# Patient Record
Sex: FEMALE | Race: BLACK OR AFRICAN AMERICAN | NOT HISPANIC OR LATINO
[De-identification: names, ages, dates, MRNs, and addresses within clinical notes are randomized per-mention and may not be internally consistent; named-entity substitution may affect disease eponyms.]

---

## 2017-12-15 ENCOUNTER — MOBILE ON CALL (OUTPATIENT)
Age: 57
End: 2017-12-15

## 2017-12-15 PROBLEM — Z00.00 ENCOUNTER FOR PREVENTIVE HEALTH EXAMINATION: Status: ACTIVE | Noted: 2017-12-15

## 2017-12-20 ENCOUNTER — APPOINTMENT (OUTPATIENT)
Dept: ORTHOPEDIC SURGERY | Facility: CLINIC | Age: 57
End: 2017-12-20
Payer: COMMERCIAL

## 2017-12-20 VITALS
WEIGHT: 170 LBS | HEIGHT: 66 IN | BODY MASS INDEX: 27.32 KG/M2 | HEART RATE: 82 BPM | SYSTOLIC BLOOD PRESSURE: 139 MMHG | DIASTOLIC BLOOD PRESSURE: 85 MMHG

## 2017-12-20 PROCEDURE — 73610 X-RAY EXAM OF ANKLE: CPT | Mod: LT

## 2017-12-20 PROCEDURE — 99203 OFFICE O/P NEW LOW 30 MIN: CPT

## 2018-01-17 ENCOUNTER — APPOINTMENT (OUTPATIENT)
Dept: ORTHOPEDIC SURGERY | Facility: CLINIC | Age: 58
End: 2018-01-17
Payer: COMMERCIAL

## 2018-01-17 PROCEDURE — 99214 OFFICE O/P EST MOD 30 MIN: CPT

## 2018-01-26 ENCOUNTER — APPOINTMENT (OUTPATIENT)
Dept: ORTHOPEDIC SURGERY | Facility: CLINIC | Age: 58
End: 2018-01-26
Payer: COMMERCIAL

## 2018-01-26 PROCEDURE — 99212 OFFICE O/P EST SF 10 MIN: CPT

## 2018-01-26 RX ORDER — DOCUSATE SODIUM 100 MG/1
100 CAPSULE, LIQUID FILLED ORAL
Qty: 50 | Refills: 0 | Status: COMPLETED | COMMUNITY
Start: 2018-01-26 | End: 2018-02-20

## 2018-01-26 RX ORDER — ASPIRIN 325 MG/1
325 TABLET, FILM COATED ORAL
Qty: 28 | Refills: 0 | Status: COMPLETED | COMMUNITY
Start: 2018-01-26 | End: 2018-02-25

## 2018-01-29 ENCOUNTER — OUTPATIENT (OUTPATIENT)
Dept: OUTPATIENT SERVICES | Facility: HOSPITAL | Age: 58
LOS: 1 days | End: 2018-01-29
Payer: MEDICAID

## 2018-01-29 VITALS
SYSTOLIC BLOOD PRESSURE: 115 MMHG | WEIGHT: 175.05 LBS | RESPIRATION RATE: 16 BRPM | TEMPERATURE: 98 F | OXYGEN SATURATION: 98 % | HEART RATE: 76 BPM | HEIGHT: 66 IN | DIASTOLIC BLOOD PRESSURE: 76 MMHG

## 2018-01-29 DIAGNOSIS — Z98.890 OTHER SPECIFIED POSTPROCEDURAL STATES: Chronic | ICD-10-CM

## 2018-01-29 DIAGNOSIS — S82.842A DISPLACED BIMALLEOLAR FRACTURE OF LEFT LOWER LEG, INITIAL ENCOUNTER FOR CLOSED FRACTURE: ICD-10-CM

## 2018-01-29 DIAGNOSIS — T84.498A OTHER MECHANICAL COMPLICATION OF OTHER INTERNAL ORTHOPEDIC DEVICES, IMPLANTS AND GRAFTS, INITIAL ENCOUNTER: ICD-10-CM

## 2018-01-29 DIAGNOSIS — T84.84XA PAIN DUE TO INTERNAL ORTHOPEDIC PROSTHETIC DEVICES, IMPLANTS AND GRAFTS, INITIAL ENCOUNTER: ICD-10-CM

## 2018-01-29 DIAGNOSIS — Z01.818 ENCOUNTER FOR OTHER PREPROCEDURAL EXAMINATION: ICD-10-CM

## 2018-01-29 DIAGNOSIS — S82.892S OTHER FRACTURE OF LEFT LOWER LEG, SEQUELA: ICD-10-CM

## 2018-01-29 DIAGNOSIS — Z96.7 PRESENCE OF OTHER BONE AND TENDON IMPLANTS: Chronic | ICD-10-CM

## 2018-01-29 PROCEDURE — G0463: CPT

## 2018-01-29 RX ORDER — SODIUM CHLORIDE 9 MG/ML
3 INJECTION INTRAMUSCULAR; INTRAVENOUS; SUBCUTANEOUS EVERY 8 HOURS
Qty: 0 | Refills: 0 | Status: DISCONTINUED | OUTPATIENT
Start: 2018-02-08 | End: 2018-02-16

## 2018-01-29 NOTE — H&P PST ADULT - MUSCULOSKELETAL
details… detailed exam no calf tenderness/no joint erythema/joint swelling/diminished strength/left ankle/no joint warmth/decreased ROM due to pain

## 2018-01-29 NOTE — H&P PST ADULT - NEGATIVE GENERAL GENITOURINARY SYMPTOMS
no dysuria/no flank pain L/no renal colic/no flank pain R/normal urinary frequency/no hematuria/no urinary hesitancy/no incontinence

## 2018-01-29 NOTE — H&P PST ADULT - EXTREMITIES COMMENTS
left ankle swelling, tenderness to palpation, left big toe bunion, toes movable and warm to touch bilaterally , capillary refill less than 2 seconds bilaterally, left big toe numbness as per patient

## 2018-01-29 NOTE — H&P PST ADULT - HISTORY OF PRESENT ILLNESS
57 years old female presented with left ankle pain, swelling , tenderness to touch x 2 years, pt had left ankle fracture and s/p ORIF of left ankle 2015. Diagnosed with other fracture with left leg and pain due to internal orthopedic prosthetic devices, implants and grafts and is scheduled for left ankle removal of hardware on 2/8/18.

## 2018-01-29 NOTE — H&P PST ADULT - PMH
Bunion of left foot    Bunion of right foot    Closed bimalleolar fracture of left ankle, initial encounter  2015, s/p fall  Insufficiency of tear film of both eyes    Menopause    Pterygium eye, right    Seasonal allergies    Sinusitis, chronic    Swollen ankles  left  Toe deformity, left  big toe  Uterine polyp

## 2018-01-29 NOTE — H&P PST ADULT - ASSESSMENT
57 years old female presented with other fracture with left leg and pain due to internal orthopedic prosthetic devices, implants and grafts .

## 2018-01-29 NOTE — H&P PST ADULT - PSH
S/P bunionectomy  left -2014,  right - 2012  S/P ORIF (open reduction internal fixation) fracture  left ankle with hardware- 2015

## 2018-01-29 NOTE — H&P PST ADULT - RS GEN PE MLT RESP DETAILS PC
respirations non-labored/no wheezes/good air movement/airway patent/no rhonchi/normal/no rales/clear to auscultation bilaterally

## 2018-01-29 NOTE — H&P PST ADULT - NSANTHOSAYNRD_GEN_A_CORE
No. CANDELARIA screening performed.  STOP BANG Legend: 0-2 = LOW Risk; 3-4 = INTERMEDIATE Risk; 5-8 = HIGH Risk

## 2018-01-29 NOTE — H&P PST ADULT - BLOOD AVOIDANCE/RESTRICTIONS, PROFILE
personal belief - pt doesn't want to have blood transfusion- Bloodless Program Cat 1, Noelle Villa ( [facility]  notified) personal beliefs, pt does not want to have blood transfusions, Bloodless  notified ( Noelle Villa) , pt enrolled in Bloodless Program Category 1, documents and HCP in chart,

## 2018-01-29 NOTE — H&P PST ADULT - NEGATIVE ENMT SYMPTOMS
hx of sinusitis, seasonal allergies/no dysphagia/no nasal discharge/no post-nasal discharge/no hearing difficulty/no nasal congestion/no throat pain/no sinus symptoms/no ear pain

## 2018-01-29 NOTE — H&P PST ADULT - PROBLEM SELECTOR PLAN 1
Patient  is scheduled for left ankle removal of hardware on 2/8/18.Patient is at moderate risk for this surgery.

## 2018-02-08 ENCOUNTER — APPOINTMENT (OUTPATIENT)
Dept: ORTHOPEDIC SURGERY | Facility: HOSPITAL | Age: 58
End: 2018-02-08

## 2018-02-08 ENCOUNTER — TRANSCRIPTION ENCOUNTER (OUTPATIENT)
Age: 58
End: 2018-02-08

## 2018-02-08 ENCOUNTER — OUTPATIENT (OUTPATIENT)
Dept: OUTPATIENT SERVICES | Facility: HOSPITAL | Age: 58
LOS: 1 days | End: 2018-02-08
Payer: MEDICAID

## 2018-02-08 VITALS
DIASTOLIC BLOOD PRESSURE: 64 MMHG | WEIGHT: 175.05 LBS | SYSTOLIC BLOOD PRESSURE: 120 MMHG | HEART RATE: 86 BPM | OXYGEN SATURATION: 97 % | TEMPERATURE: 98 F | HEIGHT: 66 IN | RESPIRATION RATE: 16 BRPM

## 2018-02-08 VITALS
RESPIRATION RATE: 14 BRPM | HEART RATE: 70 BPM | DIASTOLIC BLOOD PRESSURE: 57 MMHG | SYSTOLIC BLOOD PRESSURE: 122 MMHG | OXYGEN SATURATION: 100 % | TEMPERATURE: 98 F

## 2018-02-08 DIAGNOSIS — T84.84XA PAIN DUE TO INTERNAL ORTHOPEDIC PROSTHETIC DEVICES, IMPLANTS AND GRAFTS, INITIAL ENCOUNTER: ICD-10-CM

## 2018-02-08 DIAGNOSIS — Z98.890 OTHER SPECIFIED POSTPROCEDURAL STATES: Chronic | ICD-10-CM

## 2018-02-08 DIAGNOSIS — S82.892S OTHER FRACTURE OF LEFT LOWER LEG, SEQUELA: ICD-10-CM

## 2018-02-08 DIAGNOSIS — Z96.7 PRESENCE OF OTHER BONE AND TENDON IMPLANTS: Chronic | ICD-10-CM

## 2018-02-08 PROCEDURE — 20680 REMOVAL OF IMPLANT DEEP: CPT

## 2018-02-08 PROCEDURE — 76000 FLUOROSCOPY <1 HR PHYS/QHP: CPT

## 2018-02-08 RX ORDER — CELECOXIB 200 MG/1
200 CAPSULE ORAL ONCE
Qty: 0 | Refills: 0 | Status: COMPLETED | OUTPATIENT
Start: 2018-02-08 | End: 2018-02-08

## 2018-02-08 RX ORDER — OXYCODONE AND ACETAMINOPHEN 5; 325 MG/1; MG/1
1 TABLET ORAL ONCE
Qty: 0 | Refills: 0 | Status: DISCONTINUED | OUTPATIENT
Start: 2018-02-08 | End: 2018-02-08

## 2018-02-08 RX ORDER — ONDANSETRON 8 MG/1
4 TABLET, FILM COATED ORAL ONCE
Qty: 0 | Refills: 0 | Status: DISCONTINUED | OUTPATIENT
Start: 2018-02-08 | End: 2018-02-08

## 2018-02-08 RX ORDER — HYDROMORPHONE HYDROCHLORIDE 2 MG/ML
0.5 INJECTION INTRAMUSCULAR; INTRAVENOUS; SUBCUTANEOUS
Qty: 0 | Refills: 0 | Status: DISCONTINUED | OUTPATIENT
Start: 2018-02-08 | End: 2018-02-08

## 2018-02-08 RX ORDER — ACETAMINOPHEN 500 MG
975 TABLET ORAL ONCE
Qty: 0 | Refills: 0 | Status: COMPLETED | OUTPATIENT
Start: 2018-02-08 | End: 2018-02-08

## 2018-02-08 RX ADMIN — HYDROMORPHONE HYDROCHLORIDE 0.5 MILLIGRAM(S): 2 INJECTION INTRAMUSCULAR; INTRAVENOUS; SUBCUTANEOUS at 11:20

## 2018-02-08 RX ADMIN — HYDROMORPHONE HYDROCHLORIDE 0.5 MILLIGRAM(S): 2 INJECTION INTRAMUSCULAR; INTRAVENOUS; SUBCUTANEOUS at 11:59

## 2018-02-08 RX ADMIN — SODIUM CHLORIDE 3 MILLILITER(S): 9 INJECTION INTRAMUSCULAR; INTRAVENOUS; SUBCUTANEOUS at 08:51

## 2018-02-08 RX ADMIN — Medication 975 MILLIGRAM(S): at 08:51

## 2018-02-08 RX ADMIN — HYDROMORPHONE HYDROCHLORIDE 0.5 MILLIGRAM(S): 2 INJECTION INTRAMUSCULAR; INTRAVENOUS; SUBCUTANEOUS at 11:44

## 2018-02-08 RX ADMIN — CELECOXIB 200 MILLIGRAM(S): 200 CAPSULE ORAL at 08:51

## 2018-02-08 NOTE — ASU PATIENT PROFILE, ADULT - BLOOD AVOIDANCE/RESTRICTIONS, PROFILE
[place] beliefs/personal beliefs, pt does not want to have blood transfusions, Bloodless  notified ( Noelle Villa) , pt enrolled in Bloodless Program Category 1, documents and HCP in chart,

## 2018-02-08 NOTE — BRIEF OPERATIVE NOTE - PROCEDURE
<<-----Click on this checkbox to enter Procedure Removal of hardware  02/08/2018  left ankle  Active  CFRUGE

## 2018-02-08 NOTE — ASU DISCHARGE PLAN (ADULT/PEDIATRIC). - NOTIFY
Swelling that continues/Persistent Nausea and Vomiting/Bleeding that does not stop/Pain not relieved by Medications Fever greater than 101/Bleeding that does not stop/Pain not relieved by Medications/Swelling that continues/Persistent Nausea and Vomiting

## 2018-02-08 NOTE — ASU DISCHARGE PLAN (ADULT/PEDIATRIC). - DRESSING FT
keep dressing clean, dry, intact until follow up appointment; weightbear as tolerated in postop shoe

## 2018-02-08 NOTE — ASU DISCHARGE PLAN (ADULT/PEDIATRIC). - MEDICATION SUMMARY - MEDICATIONS TO TAKE
I will START or STAY ON the medications listed below when I get home from the hospital:    Flonase 50 mcg/inh nasal spray  -- 1 spray(s) into nose once a day, As Needed  -- Indication: For Per PCP    Fish Oil oral capsule  -- 1 tab(s) by mouth once a day  -- Indication: For Per PCP    estradiol 0.1 mg/24 hours twice weekly transdermal film, extended release  -- 1 patch by transdermal patch 2 times a week  -- Indication: For Per PCP    multivitamin  -- 1 tab(s) by mouth once a day  -- Indication: For Per PCP

## 2018-02-14 ENCOUNTER — APPOINTMENT (OUTPATIENT)
Dept: ORTHOPEDIC SURGERY | Facility: CLINIC | Age: 58
End: 2018-02-14
Payer: COMMERCIAL

## 2018-02-14 PROCEDURE — 99024 POSTOP FOLLOW-UP VISIT: CPT

## 2018-02-28 ENCOUNTER — APPOINTMENT (OUTPATIENT)
Dept: ORTHOPEDIC SURGERY | Facility: CLINIC | Age: 58
End: 2018-02-28
Payer: COMMERCIAL

## 2018-02-28 PROCEDURE — 99024 POSTOP FOLLOW-UP VISIT: CPT

## 2018-04-16 ENCOUNTER — APPOINTMENT (OUTPATIENT)
Dept: ORTHOPEDIC SURGERY | Facility: CLINIC | Age: 58
End: 2018-04-16
Payer: MEDICAID

## 2018-04-16 DIAGNOSIS — Z98.890 OTHER SPECIFIED POSTPROCEDURAL STATES: ICD-10-CM

## 2018-04-16 DIAGNOSIS — M21.612 BUNION OF LEFT FOOT: ICD-10-CM

## 2018-04-16 DIAGNOSIS — T84.84XA PAIN DUE TO INTERNAL ORTHOPEDIC PROSTHETIC DEVICES, IMPLANTS AND GRAFTS, INITIAL ENCOUNTER: ICD-10-CM

## 2018-04-16 DIAGNOSIS — S82.892S OTHER FRACTURE OF LEFT LOWER LEG, SEQUELA: ICD-10-CM

## 2018-04-16 DIAGNOSIS — M19.079 PRIMARY OSTEOARTHRITIS, UNSPECIFIED ANKLE AND FOOT: ICD-10-CM

## 2018-04-16 PROCEDURE — 73630 X-RAY EXAM OF FOOT: CPT | Mod: LT

## 2018-04-16 PROCEDURE — 99213 OFFICE O/P EST LOW 20 MIN: CPT | Mod: 24

## 2018-08-14 ENCOUNTER — RESULT REVIEW (OUTPATIENT)
Age: 58
End: 2018-08-14

## 2021-02-24 ENCOUNTER — RESULT REVIEW (OUTPATIENT)
Age: 61
End: 2021-02-24

## 2021-03-23 ENCOUNTER — RESULT REVIEW (OUTPATIENT)
Age: 61
End: 2021-03-23

## 2021-04-08 PROBLEM — M21.612 BUNION OF LEFT FOOT: Chronic | Status: ACTIVE | Noted: 2018-01-29

## 2021-04-08 PROBLEM — H04.123 DRY EYE SYNDROME OF BILATERAL LACRIMAL GLANDS: Chronic | Status: ACTIVE | Noted: 2018-01-29

## 2021-04-08 PROBLEM — M20.62: Chronic | Status: ACTIVE | Noted: 2018-01-29

## 2021-04-08 PROBLEM — J30.2 OTHER SEASONAL ALLERGIC RHINITIS: Chronic | Status: ACTIVE | Noted: 2018-01-29

## 2021-04-08 PROBLEM — J32.9 CHRONIC SINUSITIS, UNSPECIFIED: Chronic | Status: ACTIVE | Noted: 2018-01-29

## 2021-04-08 PROBLEM — M25.471 EFFUSION, RIGHT ANKLE: Chronic | Status: ACTIVE | Noted: 2018-01-29

## 2021-04-08 PROBLEM — Z78.0 ASYMPTOMATIC MENOPAUSAL STATE: Chronic | Status: ACTIVE | Noted: 2018-01-29

## 2021-04-08 PROBLEM — S82.842A DISPLACED BIMALLEOLAR FRACTURE OF LEFT LOWER LEG, INITIAL ENCOUNTER FOR CLOSED FRACTURE: Chronic | Status: ACTIVE | Noted: 2018-01-29

## 2021-04-08 PROBLEM — M21.611 BUNION OF RIGHT FOOT: Chronic | Status: ACTIVE | Noted: 2018-01-29

## 2021-04-08 PROBLEM — N84.0 POLYP OF CORPUS UTERI: Chronic | Status: ACTIVE | Noted: 2018-01-29

## 2021-04-08 PROBLEM — H11.001 UNSPECIFIED PTERYGIUM OF RIGHT EYE: Chronic | Status: ACTIVE | Noted: 2018-01-29

## 2021-04-13 ENCOUNTER — APPOINTMENT (OUTPATIENT)
Dept: DISASTER EMERGENCY | Facility: CLINIC | Age: 61
End: 2021-04-13

## 2021-04-13 DIAGNOSIS — Z01.818 ENCOUNTER FOR OTHER PREPROCEDURAL EXAMINATION: ICD-10-CM

## 2021-04-14 ENCOUNTER — OUTPATIENT (OUTPATIENT)
Dept: OUTPATIENT SERVICES | Facility: HOSPITAL | Age: 61
LOS: 1 days | End: 2021-04-14
Payer: MEDICAID

## 2021-04-14 VITALS
OXYGEN SATURATION: 98 % | SYSTOLIC BLOOD PRESSURE: 110 MMHG | DIASTOLIC BLOOD PRESSURE: 80 MMHG | HEART RATE: 77 BPM | TEMPERATURE: 97 F | HEIGHT: 65 IN | WEIGHT: 184.09 LBS | RESPIRATION RATE: 14 BRPM

## 2021-04-14 DIAGNOSIS — N95.0 POSTMENOPAUSAL BLEEDING: ICD-10-CM

## 2021-04-14 DIAGNOSIS — N93.9 ABNORMAL UTERINE AND VAGINAL BLEEDING, UNSPECIFIED: ICD-10-CM

## 2021-04-14 DIAGNOSIS — Z96.7 PRESENCE OF OTHER BONE AND TENDON IMPLANTS: Chronic | ICD-10-CM

## 2021-04-14 DIAGNOSIS — Z87.59 PERSONAL HISTORY OF OTHER COMPLICATIONS OF PREGNANCY, CHILDBIRTH AND THE PUERPERIUM: Chronic | ICD-10-CM

## 2021-04-14 DIAGNOSIS — Z98.890 OTHER SPECIFIED POSTPROCEDURAL STATES: Chronic | ICD-10-CM

## 2021-04-14 LAB
ANION GAP SERPL CALC-SCNC: 8 MMOL/L — SIGNIFICANT CHANGE UP (ref 7–14)
BUN SERPL-MCNC: 12 MG/DL — SIGNIFICANT CHANGE UP (ref 7–23)
CALCIUM SERPL-MCNC: 9.2 MG/DL — SIGNIFICANT CHANGE UP (ref 8.4–10.5)
CHLORIDE SERPL-SCNC: 100 MMOL/L — SIGNIFICANT CHANGE UP (ref 98–107)
CO2 SERPL-SCNC: 28 MMOL/L — SIGNIFICANT CHANGE UP (ref 22–31)
CREAT SERPL-MCNC: 0.87 MG/DL — SIGNIFICANT CHANGE UP (ref 0.5–1.3)
GLUCOSE SERPL-MCNC: 87 MG/DL — SIGNIFICANT CHANGE UP (ref 70–99)
HCT VFR BLD CALC: 38.9 % — SIGNIFICANT CHANGE UP (ref 34.5–45)
HGB BLD-MCNC: 12.5 G/DL — SIGNIFICANT CHANGE UP (ref 11.5–15.5)
MCHC RBC-ENTMCNC: 29.4 PG — SIGNIFICANT CHANGE UP (ref 27–34)
MCHC RBC-ENTMCNC: 32.1 GM/DL — SIGNIFICANT CHANGE UP (ref 32–36)
MCV RBC AUTO: 91.5 FL — SIGNIFICANT CHANGE UP (ref 80–100)
NRBC # BLD: 0 /100 WBCS — SIGNIFICANT CHANGE UP
NRBC # FLD: 0 K/UL — SIGNIFICANT CHANGE UP
PLATELET # BLD AUTO: 288 K/UL — SIGNIFICANT CHANGE UP (ref 150–400)
POTASSIUM SERPL-MCNC: 3.6 MMOL/L — SIGNIFICANT CHANGE UP (ref 3.5–5.3)
POTASSIUM SERPL-SCNC: 3.6 MMOL/L — SIGNIFICANT CHANGE UP (ref 3.5–5.3)
RBC # BLD: 4.25 M/UL — SIGNIFICANT CHANGE UP (ref 3.8–5.2)
RBC # FLD: 15.5 % — HIGH (ref 10.3–14.5)
SARS-COV-2 N GENE NPH QL NAA+PROBE: NOT DETECTED
SODIUM SERPL-SCNC: 136 MMOL/L — SIGNIFICANT CHANGE UP (ref 135–145)
WBC # BLD: 6.32 K/UL — SIGNIFICANT CHANGE UP (ref 3.8–10.5)
WBC # FLD AUTO: 6.32 K/UL — SIGNIFICANT CHANGE UP (ref 3.8–10.5)

## 2021-04-14 PROCEDURE — 93010 ELECTROCARDIOGRAM REPORT: CPT

## 2021-04-14 RX ORDER — SODIUM CHLORIDE 9 MG/ML
1000 INJECTION, SOLUTION INTRAVENOUS
Refills: 0 | Status: DISCONTINUED | OUTPATIENT
Start: 2021-04-16 | End: 2021-05-01

## 2021-04-14 RX ORDER — FLUTICASONE PROPIONATE 50 MCG
1 SPRAY, SUSPENSION NASAL
Qty: 0 | Refills: 0 | DISCHARGE

## 2021-04-14 RX ORDER — OMEGA-3 ACID ETHYL ESTERS 1 G
1 CAPSULE ORAL
Qty: 0 | Refills: 0 | DISCHARGE

## 2021-04-14 NOTE — H&P PST ADULT - NSICDXPROBLEM_GEN_ALL_CORE_FT
PROBLEM DIAGNOSES  Problem: Abnormal vaginal bleeding  Assessment and Plan: Pt. is scheduled for a D&C, hysteroscopy 4/16/21.  Pt. verbalized understanding of instructions.  Pt. had COVID test.  Pt. has not seen her PMD since pre pandemic and states she had palpitaitons last month.  Pt. instructed to obtain medical clearance.

## 2021-04-14 NOTE — H&P PST ADULT - HISTORY OF PRESENT ILLNESS
Pt. is a 61 yo female with abnormal vaginal bleeding that started 12/2020.  It fluctuates with severity of bleeding.

## 2021-04-14 NOTE — H&P PST ADULT - NSICDXFAMILYHX_GEN_ALL_CORE_FT
FAMILY HISTORY:  Mother  Still living? Yes, Estimated age: Age Unknown  Diabetes mellitus, type 2, Age at diagnosis: Age Unknown

## 2021-04-14 NOTE — H&P PST ADULT - NSICDXPASTSURGICALHX_GEN_ALL_CORE_FT
PAST SURGICAL HISTORY:  H/O 1      S/P bunionectomy left -,  right -     S/P hardware removal left ankle-    S/P ORIF (open reduction internal fixation) fracture left ankle with hardware-

## 2021-04-14 NOTE — H&P PST ADULT - REASON FOR ADMISSION
"D&C, I've been having a lot of bleeding, heavy bleeding and he did a biopsy in the office but not enough tissue"

## 2021-04-14 NOTE — H&P PST ADULT - NSANTHTOTALSCORECAL_ENT_A_CORE
Pt resting in bed no distress. To be NPO after midnight for possible EGD tomorrow. meds given per orders. No pain. Up in room stand by assist. Hourly rounds continue. Call light in reach. 2

## 2021-04-14 NOTE — H&P PST ADULT - MARITAL STATUS
Called to perform covid pre-screening prior to patients appointment.  Screening performed on all individuals presenting to the appointment.     Patient will be coming to his appointment accompanied by Self.      Triage Questions:  1. Do you have a fever >100.4*F or 83*C?   No  2. Do you have a new or worsening cough, shortness of breath, or sore throat?  none  3. Do you have any new onset of nausea, vomiting, diarrhea?         none  4. Have you had any new onset of chills, repeated shaking with chills,muscle pain, headache or loss of taste or smell?        None  5. Have you or a household member tested positive for COVID-19 in the last 14 days?        No    Screening performed on all individuals presenting to the appointment.     Screening is negative.     Single

## 2021-04-14 NOTE — H&P PST ADULT - NSICDXPASTMEDICALHX_GEN_ALL_CORE_FT
PAST MEDICAL HISTORY:  Bunion of left foot     Bunion of right foot     Closed bimalleolar fracture of left ankle, initial encounter 2015, s/p fall    Insufficiency of tear film of both eyes     Menopause     Pterygium eye, right     Seasonal allergies     Sinusitis, chronic     Swollen ankles left    Toe deformity, left big toe    Uterine polyp      PAST MEDICAL HISTORY:  Bunion of left foot     Bunion of right foot     Closed bimalleolar fracture of left ankle, initial encounter 2015, s/p fall    Insufficiency of tear film of both eyes     Menopause     Obese     Palpitations     Pterygium eye, right     Seasonal allergies     Sinusitis, chronic     Swollen ankles left    Toe deformity, left big toe    Uterine polyp

## 2021-04-15 ENCOUNTER — TRANSCRIPTION ENCOUNTER (OUTPATIENT)
Age: 61
End: 2021-04-15

## 2021-04-15 NOTE — ASU PATIENT PROFILE, ADULT - PMH
Bunion of left foot    Bunion of right foot    Closed bimalleolar fracture of left ankle, initial encounter  2015, s/p fall  Insufficiency of tear film of both eyes    Menopause    Obese    Palpitations    Pterygium eye, right    Seasonal allergies    Sinusitis, chronic    Swollen ankles  left  Toe deformity, left  big toe  Uterine polyp

## 2021-04-15 NOTE — ASU PATIENT PROFILE, ADULT - PSH
H/O 1     S/P bunionectomy  left -,  right -   S/P hardware removal  left ankle-  S/P ORIF (open reduction internal fixation) fracture  left ankle with hardware-

## 2021-04-16 ENCOUNTER — RESULT REVIEW (OUTPATIENT)
Age: 61
End: 2021-04-16

## 2021-04-16 ENCOUNTER — OUTPATIENT (OUTPATIENT)
Dept: OUTPATIENT SERVICES | Facility: HOSPITAL | Age: 61
LOS: 1 days | Discharge: HOME | End: 2021-04-16
Payer: MEDICAID

## 2021-04-16 VITALS
TEMPERATURE: 98 F | DIASTOLIC BLOOD PRESSURE: 75 MMHG | OXYGEN SATURATION: 100 % | HEIGHT: 65 IN | SYSTOLIC BLOOD PRESSURE: 129 MMHG | HEART RATE: 75 BPM | WEIGHT: 184.09 LBS | RESPIRATION RATE: 16 BRPM

## 2021-04-16 VITALS
OXYGEN SATURATION: 100 % | RESPIRATION RATE: 14 BRPM | DIASTOLIC BLOOD PRESSURE: 87 MMHG | SYSTOLIC BLOOD PRESSURE: 122 MMHG | HEART RATE: 75 BPM

## 2021-04-16 DIAGNOSIS — Z98.890 OTHER SPECIFIED POSTPROCEDURAL STATES: Chronic | ICD-10-CM

## 2021-04-16 DIAGNOSIS — Z87.59 PERSONAL HISTORY OF OTHER COMPLICATIONS OF PREGNANCY, CHILDBIRTH AND THE PUERPERIUM: Chronic | ICD-10-CM

## 2021-04-16 DIAGNOSIS — N95.0 POSTMENOPAUSAL BLEEDING: ICD-10-CM

## 2021-04-16 DIAGNOSIS — Z96.7 PRESENCE OF OTHER BONE AND TENDON IMPLANTS: Chronic | ICD-10-CM

## 2021-04-16 PROCEDURE — 88360 TUMOR IMMUNOHISTOCHEM/MANUAL: CPT | Mod: 26

## 2021-04-16 PROCEDURE — 88305 TISSUE EXAM BY PATHOLOGIST: CPT | Mod: 26

## 2021-04-16 RX ORDER — OMEGA-3 ACID ETHYL ESTERS 1 G
1 CAPSULE ORAL
Qty: 0 | Refills: 0 | DISCHARGE

## 2021-04-16 NOTE — ASU DISCHARGE PLAN (ADULT/PEDIATRIC) - CARE PROVIDER_API CALL
Kohanim, Behnam  [facility]  [address], [address]  [place] [address]  Phone: [phone]  Fax: [phone]  Follow Up Time: 2 weeks

## 2021-04-16 NOTE — ASU DISCHARGE PLAN (ADULT/PEDIATRIC) - CALL YOUR DOCTOR IF YOU HAVE ANY OF THE FOLLOWING:
Bleeding that does not stop/Pain not relieved by Medications/Fever greater than (need to indicate Fahrenheit or Celsius)/Nausea and vomiting that does not stop/Unable to urinate/Increased irritability or sluggishness

## 2021-04-16 NOTE — ASU DISCHARGE PLAN (ADULT/PEDIATRIC) - NURSING INSTRUCTIONS
You received IV Tylenol for pain management at __3:30pm _. Please DO NOT take any Tylenol (Acetaminophen) containing products, such as Vicodin, Percocet, Excedrin, and cold medications for the next 6 hours (until __9:30_ PM). DO NOT TAKE MORE THAN 3000 MG OF TYLENOL in a 24 hour period.      You received IV Toradol for pain management at _4:00pm __. Please DO NOT take Motrin/Ibuprofen/Advil/Aleve/NSAIDs (Non-Steroidal Anti-Inflammatory Drugs) for the next 6 hours (until _10__ PM).

## 2021-04-16 NOTE — BRIEF OPERATIVE NOTE - NSICDXBRIEFPROCEDURE_GEN_ALL_CORE_FT
PROCEDURES:  Hysteroscopy, without dilation and curettage of uterus 16-Apr-2021 16:39:21  Kohanim, Behnam

## 2021-04-22 LAB — SURGICAL PATHOLOGY STUDY: SIGNIFICANT CHANGE UP

## 2021-04-26 PROBLEM — E66.9 OBESITY, UNSPECIFIED: Chronic | Status: ACTIVE | Noted: 2021-04-14

## 2021-04-26 PROBLEM — R00.2 PALPITATIONS: Chronic | Status: ACTIVE | Noted: 2021-04-14

## 2021-05-01 ENCOUNTER — OUTPATIENT (OUTPATIENT)
Dept: OUTPATIENT SERVICES | Facility: HOSPITAL | Age: 61
LOS: 1 days | End: 2021-05-01
Payer: MEDICAID

## 2021-05-01 DIAGNOSIS — Z98.890 OTHER SPECIFIED POSTPROCEDURAL STATES: Chronic | ICD-10-CM

## 2021-05-01 DIAGNOSIS — Z96.7 PRESENCE OF OTHER BONE AND TENDON IMPLANTS: Chronic | ICD-10-CM

## 2021-05-01 DIAGNOSIS — Z87.59 PERSONAL HISTORY OF OTHER COMPLICATIONS OF PREGNANCY, CHILDBIRTH AND THE PUERPERIUM: Chronic | ICD-10-CM

## 2021-05-01 PROCEDURE — G9005: CPT

## 2021-05-18 ENCOUNTER — APPOINTMENT (OUTPATIENT)
Dept: GYNECOLOGIC ONCOLOGY | Facility: CLINIC | Age: 61
End: 2021-05-18
Payer: MEDICAID

## 2021-05-18 VITALS
HEIGHT: 66 IN | BODY MASS INDEX: 28.97 KG/M2 | WEIGHT: 180.25 LBS | DIASTOLIC BLOOD PRESSURE: 76 MMHG | SYSTOLIC BLOOD PRESSURE: 118 MMHG | HEART RATE: 97 BPM

## 2021-05-18 PROCEDURE — 99204 OFFICE O/P NEW MOD 45 MIN: CPT

## 2021-05-21 ENCOUNTER — INPATIENT (INPATIENT)
Facility: HOSPITAL | Age: 61
LOS: 7 days | Discharge: HOME | End: 2021-05-29
Attending: OBSTETRICS & GYNECOLOGY | Admitting: OBSTETRICS & GYNECOLOGY
Payer: MEDICAID

## 2021-05-21 ENCOUNTER — NON-APPOINTMENT (OUTPATIENT)
Age: 61
End: 2021-05-21

## 2021-05-21 VITALS
DIASTOLIC BLOOD PRESSURE: 84 MMHG | SYSTOLIC BLOOD PRESSURE: 109 MMHG | RESPIRATION RATE: 18 BRPM | TEMPERATURE: 99 F | HEIGHT: 65 IN | HEART RATE: 120 BPM | OXYGEN SATURATION: 100 %

## 2021-05-21 DIAGNOSIS — Z98.890 OTHER SPECIFIED POSTPROCEDURAL STATES: Chronic | ICD-10-CM

## 2021-05-21 DIAGNOSIS — Z87.59 PERSONAL HISTORY OF OTHER COMPLICATIONS OF PREGNANCY, CHILDBIRTH AND THE PUERPERIUM: Chronic | ICD-10-CM

## 2021-05-21 DIAGNOSIS — Z96.7 PRESENCE OF OTHER BONE AND TENDON IMPLANTS: Chronic | ICD-10-CM

## 2021-05-21 DIAGNOSIS — N93.9 ABNORMAL UTERINE AND VAGINAL BLEEDING, UNSPECIFIED: ICD-10-CM

## 2021-05-21 LAB
ALBUMIN SERPL ELPH-MCNC: 3.6 G/DL — SIGNIFICANT CHANGE UP (ref 3.3–5)
ALP SERPL-CCNC: 62 U/L — SIGNIFICANT CHANGE UP (ref 40–120)
ALT FLD-CCNC: 10 U/L — SIGNIFICANT CHANGE UP (ref 4–33)
ANION GAP SERPL CALC-SCNC: 11 MMOL/L — SIGNIFICANT CHANGE UP (ref 7–14)
AST SERPL-CCNC: 16 U/L — SIGNIFICANT CHANGE UP (ref 4–32)
BILIRUB SERPL-MCNC: <0.2 MG/DL — SIGNIFICANT CHANGE UP (ref 0.2–1.2)
BLD GP AB SCN SERPL QL: NEGATIVE — SIGNIFICANT CHANGE UP
BUN SERPL-MCNC: 19 MG/DL — SIGNIFICANT CHANGE UP (ref 7–23)
CALCIUM SERPL-MCNC: 8.7 MG/DL — SIGNIFICANT CHANGE UP (ref 8.4–10.5)
CHLORIDE SERPL-SCNC: 103 MMOL/L — SIGNIFICANT CHANGE UP (ref 98–107)
CO2 SERPL-SCNC: 24 MMOL/L — SIGNIFICANT CHANGE UP (ref 22–31)
CREAT SERPL-MCNC: 0.74 MG/DL — SIGNIFICANT CHANGE UP (ref 0.5–1.3)
GLUCOSE SERPL-MCNC: 113 MG/DL — HIGH (ref 70–99)
HCT VFR BLD CALC: 20.6 % — CRITICAL LOW (ref 34.5–45)
HGB BLD-MCNC: 6.6 G/DL — CRITICAL LOW (ref 11.5–15.5)
MCHC RBC-ENTMCNC: 29.2 PG — SIGNIFICANT CHANGE UP (ref 27–34)
MCHC RBC-ENTMCNC: 32.4 GM/DL — SIGNIFICANT CHANGE UP (ref 32–36)
MCV RBC AUTO: 90.1 FL — SIGNIFICANT CHANGE UP (ref 80–100)
NRBC # BLD: 0 /100 WBCS — SIGNIFICANT CHANGE UP
NRBC # FLD: 0 K/UL — SIGNIFICANT CHANGE UP
PLATELET # BLD AUTO: 246 K/UL — SIGNIFICANT CHANGE UP (ref 150–400)
POTASSIUM SERPL-MCNC: 4.1 MMOL/L — SIGNIFICANT CHANGE UP (ref 3.5–5.3)
POTASSIUM SERPL-SCNC: 4.1 MMOL/L — SIGNIFICANT CHANGE UP (ref 3.5–5.3)
PROT SERPL-MCNC: 6 G/DL — SIGNIFICANT CHANGE UP (ref 6–8.3)
RBC # BLD: 2.33 M/UL — LOW (ref 3.8–5.2)
RBC # FLD: 15.2 % — HIGH (ref 10.3–14.5)
RH IG SCN BLD-IMP: NEGATIVE — SIGNIFICANT CHANGE UP
RH IG SCN BLD-IMP: NEGATIVE — SIGNIFICANT CHANGE UP
SARS-COV-2 RNA SPEC QL NAA+PROBE: SIGNIFICANT CHANGE UP
SODIUM SERPL-SCNC: 138 MMOL/L — SIGNIFICANT CHANGE UP (ref 135–145)
WBC # BLD: 12.83 K/UL — HIGH (ref 3.8–10.5)
WBC # FLD AUTO: 12.83 K/UL — HIGH (ref 3.8–10.5)

## 2021-05-21 PROCEDURE — 99291 CRITICAL CARE FIRST HOUR: CPT

## 2021-05-21 PROCEDURE — 76830 TRANSVAGINAL US NON-OB: CPT | Mod: 26

## 2021-05-21 RX ORDER — ACETAMINOPHEN 500 MG
650 TABLET ORAL EVERY 6 HOURS
Refills: 0 | Status: DISCONTINUED | OUTPATIENT
Start: 2021-05-21 | End: 2021-05-26

## 2021-05-21 RX ORDER — OXYCODONE HYDROCHLORIDE 5 MG/1
5 TABLET ORAL EVERY 6 HOURS
Refills: 0 | Status: DISCONTINUED | OUTPATIENT
Start: 2021-05-21 | End: 2021-05-27

## 2021-05-21 RX ORDER — MEGESTROL ACETATE 40 MG/ML
40 SUSPENSION ORAL EVERY 12 HOURS
Refills: 0 | Status: DISCONTINUED | OUTPATIENT
Start: 2021-05-21 | End: 2021-05-24

## 2021-05-21 RX ORDER — PANTOPRAZOLE SODIUM 20 MG/1
40 TABLET, DELAYED RELEASE ORAL
Refills: 0 | Status: DISCONTINUED | OUTPATIENT
Start: 2021-05-21 | End: 2021-05-29

## 2021-05-21 RX ORDER — SODIUM CHLORIDE 9 MG/ML
1000 INJECTION, SOLUTION INTRAVENOUS
Refills: 0 | Status: DISCONTINUED | OUTPATIENT
Start: 2021-05-21 | End: 2021-05-22

## 2021-05-21 NOTE — ED PROVIDER NOTE - ATTENDING CONTRIBUTION TO CARE
I performed a history and physical exam of the patient and discussed their management with the resident and /or advanced care provider. I reviewed the resident and /or ACP's note and agree with the documented findings and plan of care. My medical decision making and observations are found above.  Awake and alert, abd soft, + vaginal bleeding

## 2021-05-21 NOTE — H&P ADULT - NSICDXPASTMEDICALHX_GEN_ALL_CORE_FT
PAST MEDICAL HISTORY:  Bunion of left foot     Bunion of right foot     Closed bimalleolar fracture of left ankle, initial encounter 2015, s/p fall    Insufficiency of tear film of both eyes     Menopause     Obese     Palpitations     Pterygium eye, right     Seasonal allergies     Sinusitis, chronic     Swollen ankles left    Toe deformity, left big toe    Uterine polyp

## 2021-05-21 NOTE — ED ADULT NURSE NOTE - OBJECTIVE STATEMENT
Pt c/o of LLQ pain and heavy vaginal bleeding x 2 weeks s/p D&C. Pt endorses dizziness and lightheadedness. Pt denies chest pain, sob, n/v/d. Respirations even/unlabored, NAD noted, sinus tachy on monitor. 20g iv to left ac, labs drawn and sent

## 2021-05-21 NOTE — H&P ADULT - HISTORY OF PRESENT ILLNESS
59yo P3 postmenopausal with simple hyperplasia with atypia (dx 4/16/21 on D&C) presenting with c/o symptomatic anemia. Pt reports PMB since 1/2021 in the setting of using Estradiol 1% patch x5 years for menopausal symptoms; she was not using progesterone concurrently. She was seen by GYN Dr. King, who performed an EMB (3/23/21) and D&C (4/16/21), the latter of which was notable for simple hyperplasia with atypia. She was seen by Dr. Hewitt for consultation on 5/18/21 and plan was made for RA hysterectomy, bilateral salpingo-oophorectomy, poss staging on 6/3. She was to stop all HRT in preparation for surgery.    Patient reports since initial D&C, she had been given 2 week course of Megace with 1 refill. She was to take 1 pill/day    59yo P3 postmenopausal with simple hyperplasia with atypia (dx 4/16/21 on D&C) presenting with c/o symptomatic anemia. Pt reports PMB since 1/2021 in the setting of using Estradiol 1% patch x5 years for menopausal symptoms; she was not using progesterone concurrently. She was seen by GYN Dr. King, who performed an EMB (3/23/21) and D&C (4/16/21), the latter of which was notable for simple hyperplasia with atypia. She was seen by Dr. Baker for second opinion and was then referred to Dr. Hewitt for consultation on 5/18/21. At that time, she had discontinued Megace x3 days and subsequently had increased bleeding. She was instructed plan was made for RA hysterectomy, bilateral salpingo-oophorectomy, poss staging on 6/3. She was to stop all HRT in preparation for surgery.    Patient reports since initial D&C, she had been given 2 week course of Megace with 1 refill. She was to take 1 pill/day      59yo P3 postmenopausal with simple hyperplasia with atypia (dx 4/16/21 on D&C) presenting with c/o symptomatic anemia. Pt reports PMB since 1/2021 in the setting of using Estradiol 1% patch x5 years for menopausal symptoms; she was not using progesterone concurrently. She was seen by GYN Dr. King, who performed an EMB (3/23/21) and D&C (4/16/21), the latter of which was notable for simple hyperplasia with atypia. After D&C, patient reports starting 2 week course of Megace with 1 refill for continued vaginal bleeding. She was to take 1 pill/day, and reports that bleeding was controlled; she would only see vaginal bleeding when using the toilet. She was seen by Dr. Baker for second opinion and was then referred to Dr. Hewitt for consultation on 5/18/21. At that time, she had discontinued Megace x3 days and subsequently had increased bleeding. She was instructed plan was made for RA hysterectomy, bilateral salpingo-oophorectomy, poss staging on 6/3. She was to stop all HRT in preparation for surgery.    Today, she presents to the ED with near syncopal episode at home when going to use shower. She denies loss of consciousness or fall. She reports having similar episodes throughout the day yesterday, associated with cold sweats, and would take frequent naps and rest to avoid LOC. She additionally restarted her Megace this AM to help with the vaginal bleeding. She reports stacking 3-4 pads and changing [id number] 2/2 saturation. She additionally packs She notes LLQ cramping discomfort, has not used analgesia.  She reports palpitations. Denies CP, SOB, HA, nausea/vomiting, fevers/chills.    61yo P3 postmenopausal with simple hyperplasia with atypia (dx 4/16/21 on D&C) presenting with c/o symptomatic anemia. Pt reports PMB since 1/2021 in the setting of using Estradiol 1% patch x5 years for menopausal symptoms; she was not using progesterone concurrently. She was seen by GYN Dr. King, who performed an EMB (3/23/21) and D&C (4/16/21), the latter of which was notable for simple hyperplasia with atypia. After D&C, patient reports starting 2 week course of Megace with 1 refill for continued vaginal bleeding. She was to take 1 pill/day, and reports that bleeding was controlled; she would only see vaginal bleeding when using the toilet. She was seen by Dr. Baker for second opinion and was then referred to Dr. Hewitt for consultation on 5/18/21. At that time, she had discontinued Megace x3 days and subsequently had increased bleeding. She was instructed plan was made for RA hysterectomy, bilateral salpingo-oophorectomy, poss staging on 6/3. She was to stop all HRT in preparation for surgery.    Today, she presents to the ED with near syncopal episode at home when going to use shower. She denies loss of consciousness or fall. She reports having similar episodes throughout the day yesterday, associated with cold sweats, and would take frequent naps and rest to avoid LOC. She additionally restarted her Megace this AM to help with the vaginal bleeding. She reports stacking 3-4 pads and changing [id number] 2/2 saturation. She additionally notes occasionally using toilet paper in addition to the pads. She notes LLQ cramping discomfort, has not used analgesia, current pain 6/10.  She reports palpitations. Denies CP, SOB, HA, nausea/vomiting, fevers/chills. Last drank a smoothie at 10:00AM.

## 2021-05-21 NOTE — ED PROVIDER NOTE - OBJECTIVE STATEMENT
59yo F no pmh presents to ED with complaint of worsening vaginal bleeding over the past 2 days and presyncopal episode prior to showering this morning, has had vagina bleeding for past several months s/p D&C on 4/16/21. Felt flushed, light headed and weak after entering shower. Pt reports seeing large volume blood, 3pads soaked in one hour, large blood clots. Pt has been taking progesterone 1-3/day. Reports mild LLQ pain for past 2 weeks, crampy in quality without radiation. Also feels fatigued. ROS otherwise negative.

## 2021-05-21 NOTE — ED ADULT TRIAGE NOTE - CHIEF COMPLAINT QUOTE
Pt had d/c for simple hyperplasia 4/1. pt co heavy vaginal bleeding x 2 weeks pt weakness. Pt reports LLQ pain 6/10 x 2 weeks

## 2021-05-21 NOTE — ED PROVIDER NOTE - CLINICAL SUMMARY MEDICAL DECISION MAKING FREE TEXT BOX
Michael:59 yo with hx of anemia and vaginal bleeding presents with +++vaginal bleeding. Will hydrate/give blood. get GYN involved early. will need chemical agents to help with bleeding Michael:59 yo with hx of anemia and vaginal bleeding presents with ++vaginal bleeding. had presyncopal episode. Will hydrate/give blood. get GYN involved early. will need chemical agents to help with bleeding

## 2021-05-21 NOTE — H&P ADULT - ASSESSMENT
61yo P3 postmenopausal with simple hyperplasia with atypia (dx 4/16/21 on D&C) presenting with c/o symptomatic anemia. Physical exam with moderate blood in vault evacuated; no active bleeding from os. Labwork notable for Hgb 6.6 and imaging notable for EM thickened to 2.4cm. Patient clinically stable at this time.     Neuro: Pain well controlled on current regimen   CV: Hemodynamically stable  Pulm: O2 sat WNL on RA, Increase ambulation, encourage incentive spirometry use  GI: Tolerating regular diet  : Voiding spontaneously  Heme: DVT ppx: Lovenox, SCDs while in bed  ID: Afebrile, No signs of infection  FEN: LR@125, Replete electrolytes PRN   Dispo:  61yo P3 postmenopausal with simple hyperplasia with atypia (dx 4/16/21 on D&C) presenting with c/o symptomatic anemia. Physical exam with moderate blood in vault evacuated; no active bleeding from os. Labwork notable for Hgb 6.6 and imaging notable for EM thickened to 2.4cm. Patient clinically stable at this time.    61yo P3 postmenopausal with simple hyperplasia with atypia (dx 4/16/21 on D&C) presenting with c/o symptomatic anemia. Physical exam with moderate blood in vault evacuated; no active bleeding from os. Labwork notable for Hgb 6.6 and imaging notable for EM thickened to 2.4cm. Patient clinically stable at this time.     Neuro: Tylenol, Oxy PRN for pain  CV: Hgb 6.6, monitor VS, 3u PRBC transfusion ordered  Pulm: O2 sat WNL on RA  FEN: LR@125, f/u AM BMP, replete lytes PRN, NPO except medications  GI: Protonix  : Voiding freely, strict I&O, monitor pad counts; Megace 40mg BID for vaginal bleeding  Heme: DVT ppx: OOB, SCDs while in bed; 3u PRBC transfusion ordered  ID: Afebrile, No signs of infection  Dispo: admit to GYN Onc, strict pad counts    seen w/A [facility]YN Onc Fellow  Brandy Altamirano R2

## 2021-05-21 NOTE — H&P ADULT - NSHPPHYSICALEXAM_GEN_ALL_CORE
Vital Signs Last 24 Hrs  T(C): 36.9 (21 May 2021 18:45), Max: 37.1 (21 May 2021 13:04)  T(F): 98.5 (21 May 2021 18:45), Max: 98.8 (21 May 2021 13:04)  HR: 91 (21 May 2021 18:45) (91 - 120)  BP: 100/60 (21 May 2021 18:45) (100/60 - 120/57)  RR: 18 (21 May 2021 18:45) (16 - 18)  SpO2: 100% (21 May 2021 18:45) (100% - 100%)    Gen: awake, alert, NAD  Pulm: CTAB, nonlabored breathing  CV: Normal S1/S2, tachycardic   Abd: soft, nontender, nondistended; no rebound/guarding  : NEFG  Speculum: moderate watery blood in vault, evacuated; no active bleeding from os; polypoid tissue at os  Bimanual: nontender fundus/adnexa b/l  Ext: nontender, nonedematous

## 2021-05-21 NOTE — ED PROVIDER NOTE - GENITOURINARY, MLM
large volume blood in vaginal vault with large clots, small tear in posterior vagina with mild active bleeding, cervical os clear, no other trauma appreciated, mildly tender inferior to os - chaperoned by nurse Mignon Raphael)

## 2021-05-21 NOTE — H&P ADULT - NSHPLABSRESULTS_GEN_ALL_CORE
6.6    12.83 )-----------( 246      ( 05-21 @ 15:24 )             20.6     05-21 @ 15:24    138  |  103  |  19  ----------------------------<  113  4.1   |  24  |  0.74    Ca    8.7      05-21 @ 15:24    TPro  6.0  /  Alb  3.6  /  TBili  <0.2  /  DBili  x   /  AST  16  /  ALT  10  /  AlkPhos  62  05-21 @ 15:24    < from: US Transvaginal (05.21.21 @ 15:23) >  EXAM:  US TRANSVAGINAL    PROCEDURE DATE:  May 21 2021     INTERPRETATION:  CLINICAL INFORMATION: Vaginal bleeding status post D&C.    LMP: N/A    COMPARISON: None available.    TECHNIQUE:  Endovaginal pelvic sonogram only.    FINDINGS:    Uterus: 13.0 cm x 9.4 cm x 10.4 cm. subserosal uterine myoma measuring up to 3.1 cm on the left and 2.0 cm posteriorly.  Endometrium: 24 mm. Thickened with regions of increased vascularity.    Right ovary: 2.5 cm x 1.2 cm x 1.9 cm. Within normal limits.  Left ovary: Not visualized.    Fluid: None.    IMPRESSION:  Thickened endometrial complex with regions of increased vascularity.  Myomatous uterus.      YANIV CASTILLO MD; Attending Radiologist  This document has been electronically signed. May 21 2021  3:45PM    < end of copied text >

## 2021-05-22 DIAGNOSIS — N93.9 ABNORMAL UTERINE AND VAGINAL BLEEDING, UNSPECIFIED: ICD-10-CM

## 2021-05-22 LAB
ANION GAP SERPL CALC-SCNC: 10 MMOL/L — SIGNIFICANT CHANGE UP (ref 7–14)
BASOPHILS # BLD AUTO: 0.06 K/UL — SIGNIFICANT CHANGE UP (ref 0–0.2)
BASOPHILS # BLD AUTO: 0.08 K/UL — SIGNIFICANT CHANGE UP (ref 0–0.2)
BASOPHILS NFR BLD AUTO: 0.5 % — SIGNIFICANT CHANGE UP (ref 0–2)
BASOPHILS NFR BLD AUTO: 0.6 % — SIGNIFICANT CHANGE UP (ref 0–2)
BUN SERPL-MCNC: 14 MG/DL — SIGNIFICANT CHANGE UP (ref 7–23)
CALCIUM SERPL-MCNC: 8.2 MG/DL — LOW (ref 8.4–10.5)
CHLORIDE SERPL-SCNC: 105 MMOL/L — SIGNIFICANT CHANGE UP (ref 98–107)
CO2 SERPL-SCNC: 21 MMOL/L — LOW (ref 22–31)
COVID-19 SPIKE DOMAIN AB INTERP: POSITIVE
COVID-19 SPIKE DOMAIN ANTIBODY RESULT: >250 U/ML — HIGH
CREAT SERPL-MCNC: 0.76 MG/DL — SIGNIFICANT CHANGE UP (ref 0.5–1.3)
EOSINOPHIL # BLD AUTO: 0.22 K/UL — SIGNIFICANT CHANGE UP (ref 0–0.5)
EOSINOPHIL # BLD AUTO: 0.22 K/UL — SIGNIFICANT CHANGE UP (ref 0–0.5)
EOSINOPHIL NFR BLD AUTO: 1.8 % — SIGNIFICANT CHANGE UP (ref 0–6)
EOSINOPHIL NFR BLD AUTO: 1.9 % — SIGNIFICANT CHANGE UP (ref 0–6)
GLUCOSE SERPL-MCNC: 91 MG/DL — SIGNIFICANT CHANGE UP (ref 70–99)
HCT VFR BLD CALC: 27.1 % — LOW (ref 34.5–45)
HCT VFR BLD CALC: 28.5 % — LOW (ref 34.5–45)
HCV AB S/CO SERPL IA: 0.09 S/CO — SIGNIFICANT CHANGE UP (ref 0–0.99)
HCV AB SERPL-IMP: SIGNIFICANT CHANGE UP
HGB BLD-MCNC: 8.9 G/DL — LOW (ref 11.5–15.5)
HGB BLD-MCNC: 9.1 G/DL — LOW (ref 11.5–15.5)
IANC: 9.14 K/UL — HIGH (ref 1.5–8.5)
IANC: 9.6 K/UL — HIGH (ref 1.5–8.5)
IMM GRANULOCYTES NFR BLD AUTO: 0.4 % — SIGNIFICANT CHANGE UP (ref 0–1.5)
IMM GRANULOCYTES NFR BLD AUTO: 0.5 % — SIGNIFICANT CHANGE UP (ref 0–1.5)
LYMPHOCYTES # BLD AUTO: 1.41 K/UL — SIGNIFICANT CHANGE UP (ref 1–3.3)
LYMPHOCYTES # BLD AUTO: 1.65 K/UL — SIGNIFICANT CHANGE UP (ref 1–3.3)
LYMPHOCYTES # BLD AUTO: 12 % — LOW (ref 13–44)
LYMPHOCYTES # BLD AUTO: 13.2 % — SIGNIFICANT CHANGE UP (ref 13–44)
MAGNESIUM SERPL-MCNC: 2.2 MG/DL — SIGNIFICANT CHANGE UP (ref 1.6–2.6)
MCHC RBC-ENTMCNC: 28.8 PG — SIGNIFICANT CHANGE UP (ref 27–34)
MCHC RBC-ENTMCNC: 29.2 PG — SIGNIFICANT CHANGE UP (ref 27–34)
MCHC RBC-ENTMCNC: 31.9 GM/DL — LOW (ref 32–36)
MCHC RBC-ENTMCNC: 32.8 GM/DL — SIGNIFICANT CHANGE UP (ref 32–36)
MCV RBC AUTO: 88.9 FL — SIGNIFICANT CHANGE UP (ref 80–100)
MCV RBC AUTO: 90.2 FL — SIGNIFICANT CHANGE UP (ref 80–100)
MONOCYTES # BLD AUTO: 0.86 K/UL — SIGNIFICANT CHANGE UP (ref 0–0.9)
MONOCYTES # BLD AUTO: 0.88 K/UL — SIGNIFICANT CHANGE UP (ref 0–0.9)
MONOCYTES NFR BLD AUTO: 7 % — SIGNIFICANT CHANGE UP (ref 2–14)
MONOCYTES NFR BLD AUTO: 7.3 % — SIGNIFICANT CHANGE UP (ref 2–14)
NEUTROPHILS # BLD AUTO: 9.14 K/UL — HIGH (ref 1.8–7.4)
NEUTROPHILS # BLD AUTO: 9.6 K/UL — HIGH (ref 1.8–7.4)
NEUTROPHILS NFR BLD AUTO: 76.9 % — SIGNIFICANT CHANGE UP (ref 43–77)
NEUTROPHILS NFR BLD AUTO: 77.9 % — HIGH (ref 43–77)
NRBC # BLD: 0 /100 WBCS — SIGNIFICANT CHANGE UP
NRBC # BLD: 0 /100 WBCS — SIGNIFICANT CHANGE UP
NRBC # FLD: 0 K/UL — SIGNIFICANT CHANGE UP
NRBC # FLD: 0.02 K/UL — HIGH
PHOSPHATE SERPL-MCNC: 2.8 MG/DL — SIGNIFICANT CHANGE UP (ref 2.5–4.5)
PLATELET # BLD AUTO: 164 K/UL — SIGNIFICANT CHANGE UP (ref 150–400)
PLATELET # BLD AUTO: 175 K/UL — SIGNIFICANT CHANGE UP (ref 150–400)
POTASSIUM SERPL-MCNC: 3.7 MMOL/L — SIGNIFICANT CHANGE UP (ref 3.5–5.3)
POTASSIUM SERPL-SCNC: 3.7 MMOL/L — SIGNIFICANT CHANGE UP (ref 3.5–5.3)
RBC # BLD: 3.05 M/UL — LOW (ref 3.8–5.2)
RBC # BLD: 3.16 M/UL — LOW (ref 3.8–5.2)
RBC # FLD: 15.8 % — HIGH (ref 10.3–14.5)
RBC # FLD: 15.8 % — HIGH (ref 10.3–14.5)
SARS-COV-2 IGG+IGM SERPL QL IA: >250 U/ML — HIGH
SARS-COV-2 IGG+IGM SERPL QL IA: POSITIVE
SODIUM SERPL-SCNC: 136 MMOL/L — SIGNIFICANT CHANGE UP (ref 135–145)
WBC # BLD: 11.74 K/UL — HIGH (ref 3.8–10.5)
WBC # BLD: 12.49 K/UL — HIGH (ref 3.8–10.5)
WBC # FLD AUTO: 11.74 K/UL — HIGH (ref 3.8–10.5)
WBC # FLD AUTO: 12.49 K/UL — HIGH (ref 3.8–10.5)

## 2021-05-22 PROCEDURE — 99231 SBSQ HOSP IP/OBS SF/LOW 25: CPT | Mod: GC

## 2021-05-22 RX ADMIN — MEGESTROL ACETATE 40 MILLIGRAM(S): 40 SUSPENSION ORAL at 05:08

## 2021-05-22 RX ADMIN — MEGESTROL ACETATE 40 MILLIGRAM(S): 40 SUSPENSION ORAL at 17:30

## 2021-05-22 RX ADMIN — PANTOPRAZOLE SODIUM 40 MILLIGRAM(S): 20 TABLET, DELAYED RELEASE ORAL at 05:08

## 2021-05-22 NOTE — PROGRESS NOTE ADULT - PROBLEM SELECTOR PLAN 1
Neuro: Tylenol, Oxy PRN for pain  CV: Hgb 6.6, monitor VS, 3u PRBC transfused overnight  - f/u AM CBC  Pulm: O2 sat WNL on RA  FEN: LR@125, f/u AM BMP, replete lytes PRN, NPO except medications  GI: Protonix  : Voiding freely, strict I&O, monitor pad counts; Megace 40mg BID for vaginal bleeding  Heme: DVT ppx: OOB, SCDs while in bed; 3u PRBC transfusion ordered  ID: Afebrile, No signs of infection    Reed Ross PGY2 Neuro: Tylenol, Oxy PRN for pain  CV: Hgb 6.6, monitor VS, 3u PRBC transfused overnight  - f/u AM CBC @10a  Pulm: O2 sat WNL on RA  FEN: LR@125, f/u AM BMP, replete lytes PRN, NPO except medications  GI: Protonix, NPO  : Voiding freely, strict I&O, monitor pad counts; Megace 40mg BID for vaginal bleeding  Heme: DVT ppx: OOB, SCDs while in bed; 3u PRBC transfusion ordered  ID: Afebrile, No signs of infection    Reed Ross PGY2 Neuro: Tylenol, Oxy PRN for pain  CV: Hgb 6.6, monitor VS, 3u PRBC transfused overnight  - f/u AM CBC @10a  Pulm: O2 sat WNL on RA  FEN: LR@125, f/u AM BMP, replete lytes PRN, NPO except medications  GI: Protonix, NPO  : Voiding freely, strict I&O, monitor pad counts; Megace 40mg BID for vaginal bleeding  Heme: DVT ppx: OOB, SCDs while in bed; 3u PRBC transfusion ordered  ID: Afebrile, No signs of infection    Reed Ross PGY2    Fellow Addendum  Agree with above.  Pt receiving third unit during rounds.  Due to end around 8am, then will draw 2hr post-CBC.  Significant amount of blood on pad this am on rounds.  Per nursing fresh pad placed at 3am.  On rounds (6.30am) noted to be wearing two pads, both fully soaked, with bleeding onto gown/rolando underneath.  If post-tx CBC did not rise appropriately then will continue to transfuse prn.  VSS, no symptoms of anemia.  Pt instructed to ambulate only with nursing.  Strict pad counts. Continue NPO for now until post-tx labs result.  MD Claire

## 2021-05-22 NOTE — PROGRESS NOTE ADULT - ASSESSMENT
Assessment/Plan: 59yo P3 postmenopausal with simple hyperplasia with atypia (dx 4/16/21 on D&C) presenting with c/o symptomatic anemia w/ admission Hgb of 6.6 without active bleeding.

## 2021-05-22 NOTE — PROGRESS NOTE ADULT - SUBJECTIVE AND OBJECTIVE BOX
Gyn ONC Progress Note POD# HD#    Subjective:   Pt seen and examined at bedside. No events overnight. Pain well controlled. Patient ambulating.  Pt denies fever, chills, chest pain, SOB, nausea, vomiting, lightheadedness, dizziness.      Objective:  T(F): 97.9 (05-21-21 @ 22:36), Max: 98.8 (05-21-21 @ 13:04)  HR: 97 (05-21-21 @ 22:36) (84 - 120)  BP: 120/67 (05-21-21 @ 22:36) (100/60 - 120/67)  RR: 18 (05-21-21 @ 22:36) (16 - 18)  SpO2: 100% (05-21-21 @ 22:36) (100% - 100%)  Wt(kg): --  I&O's Summary    21 May 2021 07:01  -  22 May 2021 02:26  --------------------------------------------------------  IN: 0 mL / OUT: 250 mL / NET: -250 mL    MEDICATIONS  (STANDING):  lactated ringers. 1000 milliLiter(s) (125 mL/Hr) IV Continuous <Continuous>  megestrol 40 milliGRAM(s) Oral every 12 hours  pantoprazole    Tablet 40 milliGRAM(s) Oral before breakfast    MEDICATIONS  (PRN):  acetaminophen   Tablet .. 650 milliGRAM(s) Oral every 6 hours PRN Mild Pain (1 - 3), Moderate Pain (4 - 6)  oxyCODONE    IR 5 milliGRAM(s) Oral every 6 hours PRN Severe Pain (7 - 10)      Physical Exam:  Constitutional: NAD, A+O x3  CV: RRR  Lungs: clear to auscultation bilaterally  Abdomen: soft, nondistended, no guarding, no rebound, normal bowel sounds  : __ Pads used overnight. Bleeding is ____  Extremities: no lower extremity edema or calf tenderness bilaterally; venodynes in place    LABS:            6.6      12.83 )------------( 246        ( 05-21-21 @ 15:24 )            20.6    05-21    138    |  103    |  19     ----------------------------<  113<H>  4.1     |  24     |  0.74     Ca    8.7        21 May 2021 15:24    TPro  6.0    /  Alb  3.6    /  TBili  <0.2   /  DBili  x      /  AST  16     /  ALT  10     /  AlkPhos  62     05-2 Gyn ONC Progress Note POD# HD#    Subjective:   Pt seen and examined at bedside. No events overnight. Pain well controlled. Patient not ambulating overnight. Three pads used overnight. AM pad soaked through with blood. Pt denies fever, chills, chest pain, SOB, nausea, vomiting, lightheadedness, dizziness.      Objective:  T(F): 97.9 (05-21-21 @ 22:36), Max: 98.8 (05-21-21 @ 13:04)  HR: 97 (05-21-21 @ 22:36) (84 - 120)  BP: 120/67 (05-21-21 @ 22:36) (100/60 - 120/67)  RR: 18 (05-21-21 @ 22:36) (16 - 18)  SpO2: 100% (05-21-21 @ 22:36) (100% - 100%)  Wt(kg): --  I&O's Summary    21 May 2021 07:01  -  22 May 2021 02:26  --------------------------------------------------------  IN: 0 mL / OUT: 250 mL / NET: -250 mL    MEDICATIONS  (STANDING):  lactated ringers. 1000 milliLiter(s) (125 mL/Hr) IV Continuous <Continuous>  megestrol 40 milliGRAM(s) Oral every 12 hours  pantoprazole    Tablet 40 milliGRAM(s) Oral before breakfast    MEDICATIONS  (PRN):  acetaminophen   Tablet .. 650 milliGRAM(s) Oral every 6 hours PRN Mild Pain (1 - 3), Moderate Pain (4 - 6)  oxyCODONE    IR 5 milliGRAM(s) Oral every 6 hours PRN Severe Pain (7 - 10)      Physical Exam:  Constitutional: NAD, A+O x3  CV: RRR  Lungs: clear to auscultation bilaterally  Abdomen: soft, nondistended, no guarding, no rebound, normal bowel sounds  : 3 Pads used overnight. Bleeding is moderate with fully soaked pad over 3 hours  Extremities: no lower extremity edema or calf tenderness bilaterally; venodynes in place    LABS:            6.6      12.83 )------------( 246        ( 05-21-21 @ 15:24 )            20.6    05-21    138    |  103    |  19     ----------------------------<  113<H>  4.1     |  24     |  0.74     Ca    8.7        21 May 2021 15:24    TPro  6.0    /  Alb  3.6    /  TBili  <0.2   /  DBili  x      /  AST  16     /  ALT  10     /  AlkPhos  62     05-2

## 2021-05-23 LAB
ANION GAP SERPL CALC-SCNC: 9 MMOL/L — SIGNIFICANT CHANGE UP (ref 7–14)
BASOPHILS # BLD AUTO: 0.06 K/UL — SIGNIFICANT CHANGE UP (ref 0–0.2)
BASOPHILS # BLD AUTO: 0.06 K/UL — SIGNIFICANT CHANGE UP (ref 0–0.2)
BASOPHILS NFR BLD AUTO: 0.6 % — SIGNIFICANT CHANGE UP (ref 0–2)
BASOPHILS NFR BLD AUTO: 0.6 % — SIGNIFICANT CHANGE UP (ref 0–2)
BUN SERPL-MCNC: 10 MG/DL — SIGNIFICANT CHANGE UP (ref 7–23)
CALCIUM SERPL-MCNC: 8 MG/DL — LOW (ref 8.4–10.5)
CHLORIDE SERPL-SCNC: 107 MMOL/L — SIGNIFICANT CHANGE UP (ref 98–107)
CO2 SERPL-SCNC: 23 MMOL/L — SIGNIFICANT CHANGE UP (ref 22–31)
CREAT SERPL-MCNC: 0.73 MG/DL — SIGNIFICANT CHANGE UP (ref 0.5–1.3)
EOSINOPHIL # BLD AUTO: 0.29 K/UL — SIGNIFICANT CHANGE UP (ref 0–0.5)
EOSINOPHIL # BLD AUTO: 0.3 K/UL — SIGNIFICANT CHANGE UP (ref 0–0.5)
EOSINOPHIL NFR BLD AUTO: 2.8 % — SIGNIFICANT CHANGE UP (ref 0–6)
EOSINOPHIL NFR BLD AUTO: 3 % — SIGNIFICANT CHANGE UP (ref 0–6)
GLUCOSE SERPL-MCNC: 114 MG/DL — HIGH (ref 70–99)
HCT VFR BLD CALC: 24 % — LOW (ref 34.5–45)
HCT VFR BLD CALC: 29.9 % — LOW (ref 34.5–45)
HGB BLD-MCNC: 7.9 G/DL — LOW (ref 11.5–15.5)
HGB BLD-MCNC: 9.9 G/DL — LOW (ref 11.5–15.5)
IANC: 6.83 K/UL — SIGNIFICANT CHANGE UP (ref 1.5–8.5)
IANC: 7.47 K/UL — SIGNIFICANT CHANGE UP (ref 1.5–8.5)
IMM GRANULOCYTES NFR BLD AUTO: 0.4 % — SIGNIFICANT CHANGE UP (ref 0–1.5)
IMM GRANULOCYTES NFR BLD AUTO: 0.5 % — SIGNIFICANT CHANGE UP (ref 0–1.5)
LYMPHOCYTES # BLD AUTO: 1.6 K/UL — SIGNIFICANT CHANGE UP (ref 1–3.3)
LYMPHOCYTES # BLD AUTO: 1.89 K/UL — SIGNIFICANT CHANGE UP (ref 1–3.3)
LYMPHOCYTES # BLD AUTO: 15.6 % — SIGNIFICANT CHANGE UP (ref 13–44)
LYMPHOCYTES # BLD AUTO: 19.1 % — SIGNIFICANT CHANGE UP (ref 13–44)
MAGNESIUM SERPL-MCNC: 2.1 MG/DL — SIGNIFICANT CHANGE UP (ref 1.6–2.6)
MCHC RBC-ENTMCNC: 29.3 PG — SIGNIFICANT CHANGE UP (ref 27–34)
MCHC RBC-ENTMCNC: 29.6 PG — SIGNIFICANT CHANGE UP (ref 27–34)
MCHC RBC-ENTMCNC: 32.9 GM/DL — SIGNIFICANT CHANGE UP (ref 32–36)
MCHC RBC-ENTMCNC: 33.1 GM/DL — SIGNIFICANT CHANGE UP (ref 32–36)
MCV RBC AUTO: 88.9 FL — SIGNIFICANT CHANGE UP (ref 80–100)
MCV RBC AUTO: 89.3 FL — SIGNIFICANT CHANGE UP (ref 80–100)
MONOCYTES # BLD AUTO: 0.75 K/UL — SIGNIFICANT CHANGE UP (ref 0–0.9)
MONOCYTES # BLD AUTO: 0.77 K/UL — SIGNIFICANT CHANGE UP (ref 0–0.9)
MONOCYTES NFR BLD AUTO: 7.5 % — SIGNIFICANT CHANGE UP (ref 2–14)
MONOCYTES NFR BLD AUTO: 7.6 % — SIGNIFICANT CHANGE UP (ref 2–14)
NEUTROPHILS # BLD AUTO: 6.83 K/UL — SIGNIFICANT CHANGE UP (ref 1.8–7.4)
NEUTROPHILS # BLD AUTO: 7.47 K/UL — HIGH (ref 1.8–7.4)
NEUTROPHILS NFR BLD AUTO: 69.3 % — SIGNIFICANT CHANGE UP (ref 43–77)
NEUTROPHILS NFR BLD AUTO: 73 % — SIGNIFICANT CHANGE UP (ref 43–77)
NRBC # BLD: 0 /100 WBCS — SIGNIFICANT CHANGE UP
NRBC # BLD: 0 /100 WBCS — SIGNIFICANT CHANGE UP
NRBC # FLD: 0 K/UL — SIGNIFICANT CHANGE UP
NRBC # FLD: 0 K/UL — SIGNIFICANT CHANGE UP
PHOSPHATE SERPL-MCNC: 2.7 MG/DL — SIGNIFICANT CHANGE UP (ref 2.5–4.5)
PLATELET # BLD AUTO: 144 K/UL — LOW (ref 150–400)
PLATELET # BLD AUTO: 150 K/UL — SIGNIFICANT CHANGE UP (ref 150–400)
POTASSIUM SERPL-MCNC: 3.6 MMOL/L — SIGNIFICANT CHANGE UP (ref 3.5–5.3)
POTASSIUM SERPL-SCNC: 3.6 MMOL/L — SIGNIFICANT CHANGE UP (ref 3.5–5.3)
RBC # BLD: 2.7 M/UL — LOW (ref 3.8–5.2)
RBC # BLD: 3.35 M/UL — LOW (ref 3.8–5.2)
RBC # FLD: 15.3 % — HIGH (ref 10.3–14.5)
RBC # FLD: 15.8 % — HIGH (ref 10.3–14.5)
SODIUM SERPL-SCNC: 139 MMOL/L — SIGNIFICANT CHANGE UP (ref 135–145)
WBC # BLD: 10.24 K/UL — SIGNIFICANT CHANGE UP (ref 3.8–10.5)
WBC # BLD: 9.87 K/UL — SIGNIFICANT CHANGE UP (ref 3.8–10.5)
WBC # FLD AUTO: 10.24 K/UL — SIGNIFICANT CHANGE UP (ref 3.8–10.5)
WBC # FLD AUTO: 9.87 K/UL — SIGNIFICANT CHANGE UP (ref 3.8–10.5)

## 2021-05-23 PROCEDURE — 99231 SBSQ HOSP IP/OBS SF/LOW 25: CPT | Mod: GC

## 2021-05-23 RX ADMIN — PANTOPRAZOLE SODIUM 40 MILLIGRAM(S): 20 TABLET, DELAYED RELEASE ORAL at 05:19

## 2021-05-23 RX ADMIN — MEGESTROL ACETATE 40 MILLIGRAM(S): 40 SUSPENSION ORAL at 05:19

## 2021-05-23 RX ADMIN — MEGESTROL ACETATE 40 MILLIGRAM(S): 40 SUSPENSION ORAL at 17:51

## 2021-05-23 NOTE — PROGRESS NOTE ADULT - SUBJECTIVE AND OBJECTIVE BOX
Gyn ONC Progress Note HD#3    Subjective:   Pt seen and examined at bedside. No events overnight. Pain well controlled. Patient ambulating. Overnight 4 pads used. Tolerating regular diet. Pt denies fever, chills, chest pain, SOB, nausea, vomiting, lightheadedness, dizziness.      Objective:  T(F): 98 (05-23-21 @ 05:16), Max: 98.9 (05-22-21 @ 22:10)  HR: 82 (05-23-21 @ 05:16) (82 - 100)  BP: 110/61 (05-23-21 @ 05:16) (100/60 - 120/69)  RR: 17 (05-23-21 @ 05:16) (17 - 17)  SpO2: 100% (05-23-21 @ 05:16) (99% - 100%)  Wt(kg): --  I&O's Summary    21 May 2021 07:01  -  22 May 2021 07:00  --------------------------------------------------------  IN: 310 mL / OUT: 550 mL / NET: -240 mL    22 May 2021 07:01  -  23 May 2021 05:33  --------------------------------------------------------  IN: 980 mL / OUT: 1700 mL / NET: -720 mL    MEDICATIONS  (STANDING):  megestrol 40 milliGRAM(s) Oral every 12 hours  pantoprazole    Tablet 40 milliGRAM(s) Oral before breakfast    MEDICATIONS  (PRN):  acetaminophen   Tablet .. 650 milliGRAM(s) Oral every 6 hours PRN Mild Pain (1 - 3), Moderate Pain (4 - 6)  oxyCODONE    IR 5 milliGRAM(s) Oral every 6 hours PRN Severe Pain (7 - 10)      Physical Exam:  Constitutional: NAD, A+O x3  CV: RRR  Lungs: clear to auscultation bilaterally  Abdomen: soft, nondistended, no guarding, no rebound, normal bowel sounds  : 4 Pads used overnight. Bleeding is moderate   Extremities: no lower extremity edema or calf tenderness bilaterally; venodynes in place      LABS:            8.9      12.49 )------------( 164        ( 05-22-21 @ 18:24 )            27.1            9.1      11.74 )------------( 175        ( 05-22-21 @ 11:41 )            28.5    05-22    136    |  105    |  14     ----------------------------<  91     3.7     |  21<L>  |  0.76     Ca    8.2<L>      22 May 2021 11:41  Phos  2.8       05-22  Mg     2.2       05-22 Gyn ONC Progress Note HD#3    Subjective:   Pt seen and examined at bedside. No events overnight. Pain well controlled. Patient ambulating. Overnight changed pad twice (wears two at a time) and both times, both pads were saturated. Tolerating regular diet.  Endorses feeling lightheaded with walking. Pt denies fever, chills, chest pain, SOB, nausea, vomiting.    Objective:  T(F): 98 (05-23-21 @ 05:16), Max: 98.9 (05-22-21 @ 22:10)  HR: 82 (05-23-21 @ 05:16) (82 - 100)  BP: 110/61 (05-23-21 @ 05:16) (100/60 - 120/69)  RR: 17 (05-23-21 @ 05:16) (17 - 17)  SpO2: 100% (05-23-21 @ 05:16) (99% - 100%)  Wt(kg): --  I&O's Summary    21 May 2021 07:01  -  22 May 2021 07:00  --------------------------------------------------------  IN: 310 mL / OUT: 550 mL / NET: -240 mL    22 May 2021 07:01  -  23 May 2021 05:33  --------------------------------------------------------  IN: 980 mL / OUT: 1700 mL / NET: -720 mL    MEDICATIONS  (STANDING):  megestrol 40 milliGRAM(s) Oral every 12 hours  pantoprazole    Tablet 40 milliGRAM(s) Oral before breakfast    MEDICATIONS  (PRN):  acetaminophen   Tablet .. 650 milliGRAM(s) Oral every 6 hours PRN Mild Pain (1 - 3), Moderate Pain (4 - 6)  oxyCODONE    IR 5 milliGRAM(s) Oral every 6 hours PRN Severe Pain (7 - 10)      Physical Exam:  Constitutional: NAD, A+O x3  CV: RRR  Lungs: clear to auscultation bilaterally  Abdomen: soft, nondistended, no guarding, no rebound, normal bowel sounds  : Pad changed about 2hr prior to exam, about 10% saturated.  Extremities: no lower extremity edema or calf tenderness bilaterally; venodynes in place      LABS:            8.9      12.49 )------------( 164        ( 05-22-21 @ 18:24 )            27.1            9.1      11.74 )------------( 175        ( 05-22-21 @ 11:41 )            28.5    05-22    136    |  105    |  14     ----------------------------<  91     3.7     |  21<L>  |  0.76     Ca    8.2<L>      22 May 2021 11:41  Phos  2.8       05-22  Mg     2.2       05-22

## 2021-05-23 NOTE — PROGRESS NOTE ADULT - ASSESSMENT
Assessment/Plan: 59yo P3 postmenopausal with simple hyperplasia with atypia (dx 4/16/21 on D&C) presenting with c/o symptomatic anemia w/ admission Hgb of 6.6 with moderate bleeding at this time.

## 2021-05-23 NOTE — PROGRESS NOTE ADULT - PROBLEM SELECTOR PLAN 1
Neuro: Tylenol, Oxy PRN for pain  CV: Admission Hgb 6.6, monitor VS,  - s/p 3uPRBC (5/22) with appropriate rise and stable Hgb  - f/u AM CBC  Pulm: O2 sat WNL on RA  FEN: SLIV, f/u AM BMP, replete lytes PRN, NPO except medications  GI: Protonix, Regular diet  : Voiding freely, strict I&O, monitor pad counts; Megace 40mg BID for vaginal bleeding  Heme: DVT ppx: OOB, SCDs while in bed;  ID: Afebrile, No signs of infection    Reed Ross PGY2 Neuro: Tylenol, Oxy PRN for pain  CV: Admission Hgb 6.6, monitor VS,  - s/p 3uPRBC (5/22) with appropriate rise, but Hgb continues to downtrend  - f/u AM CBC  Pulm: O2 sat WNL on RA  FEN: SLIV, f/u AM BMP, replete lytes PRN, NPO except medications  GI: Protonix, Regular diet  : Voiding freely, strict I&O, monitor pad counts; Megace 40mg BID for vaginal bleeding  Heme: DVT ppx: OOB, SCDs while in bed;  ID: Afebrile, No signs of infection    Reed Ross PGY2    Fellow Addendum  Agree with above.  Pt endorsing lightheadedness with walking and appears pale on exam.  VSS, UOP adeq, pad with 10% saturation after 2hr, Hgb downtrending.   Hgb: 6.6 -> 3u -> 9.1 -> 8.9 ->7.9 today.  Plan to transfuse 1u PRBC, check 2hr post-transfusion labs. Overall, feel VB is slowing down on the megace.  Continue strict pad counts.    MD Claire

## 2021-05-24 ENCOUNTER — TRANSCRIPTION ENCOUNTER (OUTPATIENT)
Age: 61
End: 2021-05-24

## 2021-05-24 LAB
ANION GAP SERPL CALC-SCNC: 9 MMOL/L — SIGNIFICANT CHANGE UP (ref 7–14)
BASOPHILS # BLD AUTO: 0.05 K/UL — SIGNIFICANT CHANGE UP (ref 0–0.2)
BASOPHILS # BLD AUTO: 0.09 K/UL — SIGNIFICANT CHANGE UP (ref 0–0.2)
BASOPHILS NFR BLD AUTO: 0.6 % — SIGNIFICANT CHANGE UP (ref 0–2)
BASOPHILS NFR BLD AUTO: 1 % — SIGNIFICANT CHANGE UP (ref 0–2)
BLD GP AB SCN SERPL QL: NEGATIVE — SIGNIFICANT CHANGE UP
BUN SERPL-MCNC: 14 MG/DL — SIGNIFICANT CHANGE UP (ref 7–23)
CALCIUM SERPL-MCNC: 8.3 MG/DL — LOW (ref 8.4–10.5)
CHLORIDE SERPL-SCNC: 110 MMOL/L — HIGH (ref 98–107)
CO2 SERPL-SCNC: 21 MMOL/L — LOW (ref 22–31)
CREAT SERPL-MCNC: 0.75 MG/DL — SIGNIFICANT CHANGE UP (ref 0.5–1.3)
EOSINOPHIL # BLD AUTO: 0.36 K/UL — SIGNIFICANT CHANGE UP (ref 0–0.5)
EOSINOPHIL # BLD AUTO: 0.41 K/UL — SIGNIFICANT CHANGE UP (ref 0–0.5)
EOSINOPHIL NFR BLD AUTO: 4.1 % — SIGNIFICANT CHANGE UP (ref 0–6)
EOSINOPHIL NFR BLD AUTO: 4.8 % — SIGNIFICANT CHANGE UP (ref 0–6)
GLUCOSE SERPL-MCNC: 131 MG/DL — HIGH (ref 70–99)
HCT VFR BLD CALC: 25.5 % — LOW (ref 34.5–45)
HCT VFR BLD CALC: 28.7 % — LOW (ref 34.5–45)
HGB BLD-MCNC: 8.1 G/DL — LOW (ref 11.5–15.5)
HGB BLD-MCNC: 9.5 G/DL — LOW (ref 11.5–15.5)
IANC: 5.52 K/UL — SIGNIFICANT CHANGE UP (ref 1.5–8.5)
IANC: 5.58 K/UL — SIGNIFICANT CHANGE UP (ref 1.5–8.5)
IMM GRANULOCYTES NFR BLD AUTO: 0.3 % — SIGNIFICANT CHANGE UP (ref 0–1.5)
IMM GRANULOCYTES NFR BLD AUTO: 0.6 % — SIGNIFICANT CHANGE UP (ref 0–1.5)
LYMPHOCYTES # BLD AUTO: 2 K/UL — SIGNIFICANT CHANGE UP (ref 1–3.3)
LYMPHOCYTES # BLD AUTO: 2.07 K/UL — SIGNIFICANT CHANGE UP (ref 1–3.3)
LYMPHOCYTES # BLD AUTO: 23.2 % — SIGNIFICANT CHANGE UP (ref 13–44)
LYMPHOCYTES # BLD AUTO: 23.7 % — SIGNIFICANT CHANGE UP (ref 13–44)
MAGNESIUM SERPL-MCNC: 2 MG/DL — SIGNIFICANT CHANGE UP (ref 1.6–2.6)
MCHC RBC-ENTMCNC: 28.8 PG — SIGNIFICANT CHANGE UP (ref 27–34)
MCHC RBC-ENTMCNC: 29.2 PG — SIGNIFICANT CHANGE UP (ref 27–34)
MCHC RBC-ENTMCNC: 31.8 GM/DL — LOW (ref 32–36)
MCHC RBC-ENTMCNC: 33.1 GM/DL — SIGNIFICANT CHANGE UP (ref 32–36)
MCV RBC AUTO: 88.3 FL — SIGNIFICANT CHANGE UP (ref 80–100)
MCV RBC AUTO: 90.7 FL — SIGNIFICANT CHANGE UP (ref 80–100)
MONOCYTES # BLD AUTO: 0.6 K/UL — SIGNIFICANT CHANGE UP (ref 0–0.9)
MONOCYTES # BLD AUTO: 0.61 K/UL — SIGNIFICANT CHANGE UP (ref 0–0.9)
MONOCYTES NFR BLD AUTO: 6.9 % — SIGNIFICANT CHANGE UP (ref 2–14)
MONOCYTES NFR BLD AUTO: 7.1 % — SIGNIFICANT CHANGE UP (ref 2–14)
NEUTROPHILS # BLD AUTO: 5.52 K/UL — SIGNIFICANT CHANGE UP (ref 1.8–7.4)
NEUTROPHILS # BLD AUTO: 5.58 K/UL — SIGNIFICANT CHANGE UP (ref 1.8–7.4)
NEUTROPHILS NFR BLD AUTO: 63.7 % — SIGNIFICANT CHANGE UP (ref 43–77)
NEUTROPHILS NFR BLD AUTO: 64 % — SIGNIFICANT CHANGE UP (ref 43–77)
NRBC # BLD: 0 /100 WBCS — SIGNIFICANT CHANGE UP
NRBC # BLD: 0 /100 WBCS — SIGNIFICANT CHANGE UP
NRBC # FLD: 0 K/UL — SIGNIFICANT CHANGE UP
NRBC # FLD: 0.02 K/UL — HIGH
PHOSPHATE SERPL-MCNC: 3 MG/DL — SIGNIFICANT CHANGE UP (ref 2.5–4.5)
PLATELET # BLD AUTO: 112 K/UL — LOW (ref 150–400)
PLATELET # BLD AUTO: 125 K/UL — LOW (ref 150–400)
POTASSIUM SERPL-MCNC: 3.9 MMOL/L — SIGNIFICANT CHANGE UP (ref 3.5–5.3)
POTASSIUM SERPL-SCNC: 3.9 MMOL/L — SIGNIFICANT CHANGE UP (ref 3.5–5.3)
RBC # BLD: 2.81 M/UL — LOW (ref 3.8–5.2)
RBC # BLD: 3.25 M/UL — LOW (ref 3.8–5.2)
RBC # FLD: 15.9 % — HIGH (ref 10.3–14.5)
RBC # FLD: 16.4 % — HIGH (ref 10.3–14.5)
RH IG SCN BLD-IMP: NEGATIVE — SIGNIFICANT CHANGE UP
SODIUM SERPL-SCNC: 140 MMOL/L — SIGNIFICANT CHANGE UP (ref 135–145)
WBC # BLD: 8.62 K/UL — SIGNIFICANT CHANGE UP (ref 3.8–10.5)
WBC # BLD: 8.75 K/UL — SIGNIFICANT CHANGE UP (ref 3.8–10.5)
WBC # FLD AUTO: 8.62 K/UL — SIGNIFICANT CHANGE UP (ref 3.8–10.5)
WBC # FLD AUTO: 8.75 K/UL — SIGNIFICANT CHANGE UP (ref 3.8–10.5)

## 2021-05-24 PROCEDURE — 99233 SBSQ HOSP IP/OBS HIGH 50: CPT

## 2021-05-24 PROCEDURE — 99223 1ST HOSP IP/OBS HIGH 75: CPT | Mod: GC

## 2021-05-24 PROCEDURE — 86079 PHYS BLOOD BANK SERV AUTHRJ: CPT

## 2021-05-24 PROCEDURE — 93970 EXTREMITY STUDY: CPT | Mod: 26

## 2021-05-24 RX ORDER — POTASSIUM CHLORIDE 20 MEQ
20 PACKET (EA) ORAL ONCE
Refills: 0 | Status: COMPLETED | OUTPATIENT
Start: 2021-05-24 | End: 2021-05-24

## 2021-05-24 RX ORDER — ACETAMINOPHEN 500 MG
2 TABLET ORAL
Qty: 0 | Refills: 0 | DISCHARGE
Start: 2021-05-24

## 2021-05-24 RX ORDER — MEGESTROL ACETATE 40 MG/ML
1 SUSPENSION ORAL
Qty: 0 | Refills: 0 | DISCHARGE
Start: 2021-05-24

## 2021-05-24 RX ADMIN — MEGESTROL ACETATE 40 MILLIGRAM(S): 40 SUSPENSION ORAL at 05:47

## 2021-05-24 RX ADMIN — PANTOPRAZOLE SODIUM 40 MILLIGRAM(S): 20 TABLET, DELAYED RELEASE ORAL at 05:49

## 2021-05-24 RX ADMIN — Medication 20 MILLIEQUIVALENT(S): at 12:16

## 2021-05-24 RX ADMIN — MEGESTROL ACETATE 40 MILLIGRAM(S): 40 SUSPENSION ORAL at 17:04

## 2021-05-24 NOTE — PROGRESS NOTE ADULT - SUBJECTIVE AND OBJECTIVE BOX
GYN ONC Progress Note    Pt seen and examined at bedside. Yesterday received additional 1u PRBC transfusion with appropriate uptrend in H/H. No overnight events.  This AM, pt without complaints. Pain well controlled. Changed pad ___ overnight. Reporting improvement in lightheadedness w/ambulation. Tolerating regular diet. Voiding freely.  Denies SOB/CP/palpitations, fever/chills, nausea/emesis.     Vital Signs Last 24 Hrs  T(C): 36.8 (24 May 2021 01:35), Max: 37 (23 May 2021 22:35)  T(F): 98.2 (24 May 2021 01:35), Max: 98.6 (23 May 2021 22:35)  HR: 88 (24 May 2021 01:35) (82 - 98)  BP: 113/78 (24 May 2021 01:35) (104/57 - 113/78)  RR: 18 (24 May 2021 01:35) (17 - 18)  SpO2: 100% (24 May 2021 01:35) (99% - 100%)    05-22 @ 07:01  -  05-23 @ 07:00  --------------------------------------------------------  IN: 1100 mL / OUT: 1700 mL / NET: -600 mL    05-23 @ 07:01  -  05-24 @ 04:51  --------------------------------------------------------  IN: 120 mL / OUT: 700 mL / NET: -580 mL      PHYSICAL EXAM:  Gen: NAD, A+O x 3  CV: RRR  Pulm: CTA BL  Abd: Soft, appropriately tender, non distended, no rebound or guarding, +BS  : perineal pad in place,   Extremities: No swelling or calf tenderness, SCDs in place    Labs, additional tests:             9.9    9.87  )-----------( 150      ( 05-23 @ 16:04 )             29.9     1u PRBC               7.9    10.24 )-----------( 144      ( 05-23 @ 07:15 )             24.0                8.9    12.49 )-----------( 164      ( 05-22 @ 18:24 )             27.1                9.1    11.74 )-----------( 175      ( 05-22 @ 11:41 )             28.5     3u pRBC               6.6    12.83 )-----------( 246      ( 05-21 @ 15:24 )             20.6       05-23    139  |  107  |  10  ----------------------------<  114<H>  3.6   |  23  |  0.73    Ca    8.0<L>      23 May 2021 07:15  Phos  2.7     05-23  Mg     2.1     05-23        MEDICATIONS  (STANDING):  megestrol 40 milliGRAM(s) Oral every 12 hours  pantoprazole    Tablet 40 milliGRAM(s) Oral before breakfast    MEDICATIONS  (PRN):  acetaminophen   Tablet .. 650 milliGRAM(s) Oral every 6 hours PRN Mild Pain (1 - 3), Moderate Pain (4 - 6)  oxyCODONE    IR 5 milliGRAM(s) Oral every 6 hours PRN Severe Pain (7 - 10)   GYN ONC Progress Note    Pt seen and examined at bedside. Yesterday received additional 1u PRBC transfusion with appropriate uptrend in H/H. No overnight events.  This AM, pt without complaints. Pain well controlled. Changed 2-pads 1x overnight. Reporting improvement in lightheadedness w/ambulation. Tolerating regular diet. Voiding freely.  Reports cramping in right calf; started bottom of foot prior to presentation, now including calf, present with ambulation.  Denies SOB/CP/palpitations, fever/chills, nausea/emesis.     Vital Signs Last 24 Hrs  T(C): 36.8 (24 May 2021 01:35), Max: 37 (23 May 2021 22:35)  T(F): 98.2 (24 May 2021 01:35), Max: 98.6 (23 May 2021 22:35)  HR: 88 (24 May 2021 01:35) (82 - 98)  BP: 113/78 (24 May 2021 01:35) (104/57 - 113/78)  RR: 18 (24 May 2021 01:35) (17 - 18)  SpO2: 100% (24 May 2021 01:35) (99% - 100%)    05-22 @ 07:01 - 05-23 @ 07:00  --------------------------------------------------------  IN: 1100 mL / OUT: 1700 mL / NET: -600 mL    05-23 @ 07:01 - 05-24 @ 04:51  --------------------------------------------------------  IN: 120 mL / OUT: 700 mL / NET: -580 mL      PHYSICAL EXAM:  Gen: NAD, A+O x 3  CV: Normal S1, S2; RRR  Pulm: CTA BL  Abd: Soft, appropriately tender, non distended, no rebound or guarding, +BS  : perineal pad in place, saturated pad x2  Extremities: No swelling or calf tenderness    Labs, additional tests:             9.9    9.87  )-----------( 150      ( 05-23 @ 16:04 )             29.9     1u PRBC               7.9    10.24 )-----------( 144      ( 05-23 @ 07:15 )             24.0                8.9    12.49 )-----------( 164      ( 05-22 @ 18:24 )             27.1                9.1    11.74 )-----------( 175      ( 05-22 @ 11:41 )             28.5     3u pRBC               6.6    12.83 )-----------( 246      ( 05-21 @ 15:24 )             20.6       05-23    139  |  107  |  10  ----------------------------<  114<H>  3.6   |  23  |  0.73    Ca    8.0<L>      23 May 2021 07:15  Phos  2.7     05-23  Mg     2.1     05-23        MEDICATIONS  (STANDING):  megestrol 40 milliGRAM(s) Oral every 12 hours  pantoprazole    Tablet 40 milliGRAM(s) Oral before breakfast    MEDICATIONS  (PRN):  acetaminophen   Tablet .. 650 milliGRAM(s) Oral every 6 hours PRN Mild Pain (1 - 3), Moderate Pain (4 - 6)  oxyCODONE    IR 5 milliGRAM(s) Oral every 6 hours PRN Severe Pain (7 - 10)

## 2021-05-24 NOTE — CHART NOTE - NSCHARTNOTEFT_GEN_A_CORE
R2 GYN ONC PM Eval    Pt without acute complaints. Reports changing 2-pad-chux combo 3x today 2/2 saturated.   Denies CP, SOB, HA. Feels improved after additional 1u PRBC transfusion.     Vital Signs Last 24 Hrs  T(C): 36.9 (24 May 2021 15:15), Max: 37.1 (24 May 2021 11:40)  T(F): 98.5 (24 May 2021 15:15), Max: 98.8 (24 May 2021 11:40)  HR: 91 (24 May 2021 15:15) (85 - 92)  BP: 101/66 (24 May 2021 15:15) (97/58 - 113/78)  RR: 18 (24 May 2021 15:15) (18 - 20)  SpO2: 100% (24 May 2021 15:15) (100% - 100%)    Gen: awake, alert, NAD  : 2 perineal pads saturated  Ext: nontender, nonerythematous, nonedmeatous    Reviewed results of LE Dopplers:    < from: US Duplex Venous Lower Ext Complete, Bilateral (05.24.21 @ 14:08) >  EXAM:  US DPLX LWR EXT VEINS COMPL BI    PROCEDURE DATE:  May 24 2021   TECHNIQUE: Duplex sonography of the BILATERAL LOWER extremity veins with color and spectral Doppler, with and without compression.    FINDINGS:  RIGHT:  Normal compressibility of the RIGHT common femoral, femoral and popliteal veins.  Doppler examination shows normal spontaneous and phasic flow.  Deepvenous thrombosis is present in the right gastrocnemius vein. Deep venous thrombus is also present in the posterior tibial and soleal veins.  An additional superficial thrombus is identified in the right thigh, possibly a branch of the greater saphenous vein.    LEFT:  Normal compressibility of the LEFT common femoral, femoral and popliteal veins.  Doppler examination shows normal spontaneous and phasic flow.  Calf vein thrombus is present in the left soleal and peroneal veins.  An additional superficial thrombus is present in the left thigh, possibly a branch of the greater saphenous vein.    IMPRESSION:  No evidence of deep venous thrombosis from the level of the bilateral common femoral veins to the popliteal veins.  Acute deep venous thrombosis in the bilateral calf veins (below the knee) involving the right gastrocnemius, posterior tibial, and soleal veins, as well as the left soleal and peroneal veins.  Superficial thrombus in the bilateral thighs, possibly branches related to the greater saphenous veins.  Findings were discussed with HENRY Beard5/24/2021 2:38 PM by Dr. Orlando with read back confirmation.      - IR consulted for IVC filter placement.   - Heme consulted to confirm whether above findings warrant AC; reviewed that pt presently not on AC 2/2 heavy AUB.  - Precautions reviewed: SOB, palpitations, fevers/chills, lightheadedness/dizziness     Brandy Evelia R2

## 2021-05-24 NOTE — DISCHARGE NOTE PROVIDER - NSDCFUSCHEDAPPT_GEN_ALL_CORE_FT
MEGAN TRIPATHI ; 06/03/2021 ; [facility] [address]  MEGAN TRIPATHI ; 06/03/2021 ; [facility]  MEGAN TRIPATHI ; [facility]/16/2021 ; [facility] [address]  MEGAN TRIPATHI ; 05/30/2021 ; MEGAN DEJESUS ; 06/03/2021 ; [facility] MEGAN DUONG ; 06/08/2021 ; [facility] [address]

## 2021-05-24 NOTE — DISCHARGE NOTE PROVIDER - CARE PROVIDER_API CALL
Shannon Hewitt)  Gynecologic Oncology; Obstetrics and Gynecology  [address] [place] [address]  Phone: [phone]  Fax: [phone]  Follow Up Time:

## 2021-05-24 NOTE — CONSULT NOTE ADULT - ASSESSMENT
61yo P3 postmenopausal with simple hyperplasia with atypia (dx 4/16/21 on D&C) presenting with c/o symptomatic anemia. Physical exam on admission showing moderate blood in vault evacuated; no active bleeding from os. Presenting with  Hgb 6.6 (baseline >12) and imaging notable for EM thickened to 2.4cm. s/p 5 units PRBC this admission. Duplex showing bilateral calf vein DVT (below the knee, involving the gastrocnemius, posterior tibial and soleal veins), as well as superficial thrombus in the bilateral thighs, possibly branches related to the greater saphenous vein. Hematology consulted for anticoagulation recommendations.    #DVT  -would hold anticoagulation at this time; lower risk for PE given below the knee DVTs, as well as superficial thrombus in the thighs; no evidence of above the knee DVT. Given lower risk for embolization, can hold off on IVC filter placement  -would also hold anticoagulation in the setting of symptomatic bleeding as above  -asymptomatic below the knee DVTs can be observed with serial dopplers, but given pain, would eventually recommend treating as a symptomatic below the knee DVT (calf pain) with anticoagulation, provided that risk of bleeding is controlled, pending hysterectomy  -will hold off on sending hypercoagulable work at this time; patient has no previous personal or family hx of thrombosis, and she had 2 risk factors prior to admission (megestrol use and obesity); suspect this is a provoked VTE    Case discussed with primary team. Please call with any questions.    Hermilo Rebolledo MD, MPH  Hematology / Oncology Fellow, PGY6  p

## 2021-05-24 NOTE — DISCHARGE NOTE PROVIDER - NSDCCPTREATMENT_GEN_ALL_CORE_FT
PRINCIPAL PROCEDURE  Procedure: Hysterectomy, total, abdominal, with bilateral salpingoophorectomy  Findings and Treatment:

## 2021-05-24 NOTE — DISCHARGE NOTE PROVIDER - NSDCMRMEDTOKEN_GEN_ALL_CORE_FT
acetaminophen 325 mg oral tablet: 2 tab(s) orally every 6 hours, As needed, Mild Pain (1 - 3), Moderate Pain (4 - 6)  megestrol 40 mg oral tablet: 1 tab(s) orally every 12 hours   acetaminophen 325 mg oral tablet: 2 tab(s) orally every 6 hours, As needed, Mild Pain (1 - 3), Moderate Pain (4 - 6)  ibuprofen 600 mg oral tablet: 1 tab(s) orally every 6 hours  oxyCODONE 5 mg oral tablet: 1 tab(s) orally every 6 hours, As Needed -for severe pain MDD:4    acetaminophen 325 mg oral tablet: 2 tab(s) orally every 6 hours, As needed, Mild Pain (1 - 3), Moderate Pain (4 - 6)  ibuprofen 600 mg oral tablet: 1 tab(s) orally every 6 hours  oxyCODONE 5 mg oral tablet: 1 tab(s) orally every 6 hours, As Needed -for severe pain MDD:4   Xarelto 15 mg oral tablet: 1 tab(s) orally every 12 hours MDD:2 tabs  Xarelto 20 mg oral tablet: 1 tab(s) orally once a day

## 2021-05-24 NOTE — DISCHARGE NOTE PROVIDER - HOSPITAL COURSE
Patient admitted on POD#34 w/ symptomatic anemia. Patient had near syncopal episode in the setting of heavy vaginal bleeding while on Megace. On admission, H/H noted to be 6.6/20.6. She received 3u PRBC with improvement in H/H to 9.1/28.5.  Hct: 20.6->3uPRBC->28.5->27.1->24->1uPRBC->29.9->25.5->1uPRBC->  On HD#2, patient continued to saturate multiple pads overnight and daily while on Megace, but she symptomatically improved and her H/H remained stable. She was made NPO in anticipation of possible surgical management of ongoing vaginal bleeding and symptomatic anemia.  On HD#3, vaginal bleeding improved on Megace BID, however H/H downtrended again and thus patient received 1 additional unit of PRBC with subsequent appropriate rise in H/H. Patient was successfully advanced to a regular diet.   On HD#4, patient denied lightheadedness/dizziness, and noted that vaginal bleeding continued to improve. Due to decrease in AM H/H, patient received another unit of PRBC. Post-transfusion CBC in the afternoon revealed an appropriate rise in H/H.    Upon discharge on POD# , the patient was ambulating, voiding spontaneously, tolerating oral intake, pain was well controlled with oral medication, and vital signs were stable. Patient to have close follow up with Dr. Hewitt. Patient admitted w/ symptomatic anemia. Patient had near syncopal episode in the setting of heavy vaginal bleeding while on Megace. On admission, H/H noted to be 6.6/20.6. She received 3u PRBC with improvement in H/H to 9.1/28.5.  Hct: 20.6->3uPRBC->28.5->27.1->24->1uPRBC->29.9->25.5->1uPRBC->  On HD#2, patient continued to saturate multiple pads overnight and daily while on Megace, but she symptomatically improved and her H/H remained stable. She was made NPO in anticipation of possible surgical management of ongoing vaginal bleeding and symptomatic anemia.  On HD#3, vaginal bleeding improved on Megace BID, however H/H downtrended again and thus patient received 1 additional unit of PRBC with subsequent appropriate rise in H/H. Patient was successfully advanced to a regular diet.   On HD#4, patient denied lightheadedness/dizziness, and noted that vaginal bleeding continued to improve. Due to decrease in AM H/H, patient received another unit of PRBC. Post-transfusion CBC in the afternoon revealed an appropriate rise in H/H.  On HD#6 (5/26), patient underwent ex-lap, EDISON, BSO with . Frozen pathology benign.   POD#0, patient remained on CLD and pain well controlled with IV pain medications.  POD#1, chavarria catheter removed and patient voided freely. Patient advanced to regular diet and PO pain medications.  Patient discharged on POD#2/HD#8. The patient was ambulating, voiding spontaneously, tolerating oral intake, pain was well controlled with oral medication, and vital signs were stable. Patient to have close follow up with Dr. Hewitt. Patient admitted w/ symptomatic anemia. Patient had near syncopal episode in the setting of heavy vaginal bleeding while on Megace. On admission, H/H noted to be 6.6/20.6. She received 3u PRBC with improvement in H/H to 9.1/28.5.  Hct: 20.6->3uPRBC->28.5->27.1->24->1uPRBC->29.9->25.5->1uPRBC->  On HD#2, patient continued to saturate multiple pads overnight and daily while on Megace, but she symptomatically improved and her H/H remained stable. She was made NPO in anticipation of possible surgical management of ongoing vaginal bleeding and symptomatic anemia.  On HD#3, vaginal bleeding improved on Megace BID, however H/H downtrended again and thus patient received 1 additional unit of PRBC with subsequent appropriate rise in H/H. Patient was successfully advanced to a regular diet.   On HD#4, patient denied lightheadedness/dizziness, and noted that vaginal bleeding continued to improve. Due to decrease in AM H/H, patient received another unit of PRBC. Post-transfusion CBC in the afternoon revealed an appropriate rise in H/H.  On HD#6 (5/26), patient underwent ex-lap, EDISON, BSO with . Frozen pathology benign.   POD#0, patient remained on CLD and pain well controlled with IV pain medications.  POD#1, chavarria catheter removed and patient voided freely. Patient advanced to regular diet and PO pain medications.  Patient discharged on POD#3. The patient was ambulating, voiding spontaneously, tolerating oral intake, pain was well controlled with oral medication, and vital signs were stable. Patient to have close follow up with Dr. Hewitt.

## 2021-05-24 NOTE — DISCHARGE NOTE PROVIDER - NSRESEARCHGRANT_OVERRIDEREC_GEN_A_CORE
History of bleeding in the three months prior to treatment/This is a surgical and/or non-medical patient.

## 2021-05-24 NOTE — PROGRESS NOTE ADULT - ASSESSMENT
59yo HD#4 with simple hyperplasia with atypia admitted w/symptomatic anemia 2/2 vaginal bleeding, s/p total 4u PRBC, with improvement in bleeding on Megace 40 BID. Pt hemodynamically stable at this time.     Neuro: Tylenol, Oxy PRN for pain  CV: Hemodynamically stable, Hgb 6.6->3u->9.1->8.9->7.9->1u->9.9; monitor VS; f/u AM CBC  Pulm: O2 sat WNL on RA  FEN: SLIV, f/u AM BMP, replete lytes PRN, Reg diet  GI: Protonix  : Voiding freely, strict I&O, monitor pad counts; Megace 40mg BID for vaginal bleeding  Heme: DVT ppx: OOB, SCDs while in bed  ID: Afebrile, No signs of infection  Endo: no active issues  Dispo: inpatient care    Brandy Altamirano R2   61yo HD#4 with simple hyperplasia with atypia admitted w/symptomatic anemia 2/2 vaginal bleeding, s/p total 4u PRBC. Pt hemodynamically stable at this time.     Neuro: Tylenol, Oxy PRN for pain  CV: Hemodynamically stable, Hgb 6.6->3u->9.1->8.9->7.9->1u->9.9-.8.1; monitor VS; 1u PRBC, post-txf CBC  Pulm: O2 sat WNL on RA  FEN: SLIV, f/u AM BMP, replete lytes PRN, Reg diet  GI: Protonix  : Voiding freely, strict I&O, monitor pad counts; Megace 40mg BID for vaginal bleeding  Heme: DVT ppx: OOB, SCDs while in bed; RLE Doppler r/o DVT  ID: Afebrile, No signs of infection  Endo: no active issues  Dispo: inpatient care    Brandy ROSS

## 2021-05-24 NOTE — DISCHARGE NOTE PROVIDER - NSDCFUADDINST_GEN_ALL_CORE_FT
Follow up with your doctor as scheduled. Take pain medications as instructed. Take Megace as prescribed. Call your doctor if you experience fevers >100.4, pain uncontrolled with medications, heavy vaginal bleeding, or inability to void. Follow up with Dr. Hewitt as scheduled on June 8. Take motrin and tylenol for pain control. Oxycodone has been sent to your pharmacy for more severe pain. Take Megace as prescribed. Call your doctor if you experience fevers >100.4, pain uncontrolled with medications, heavy vaginal bleeding, or inability to void. Follow up with Dr. Hewitt as scheduled on June 8. Take motrin and tylenol for pain control. Oxycodone has been sent to your pharmacy for more severe pain. Call your doctor if you experience fevers >100.4, pain uncontrolled with medications, heavy vaginal bleeding, or inability to void. Follow up with Dr. Hewitt as scheduled on June 8. Take Motrin and Tylenol for pain control. A prescription for Oxycodone has been sent to your pharmacy for more severe pain. A prescription for Xarelto has also been sent to  your pharmacy on file. Please take Xarelto as prescribed for anticoagulation. Call your doctor if you experience fevers >100.4, pain uncontrolled with medications, heavy vaginal bleeding, or inability to void. Follow up with Dr. Hewitt as scheduled on June 8 at 440pm. Take Motrin and Tylenol for pain control. A prescription for Oxycodone has been sent to your pharmacy for more severe pain. A prescription for Xarelto has also been sent to  your pharmacy on file. Please take Xarelto as prescribed for anticoagulation. Call your doctor if you experience fevers >100.4, pain uncontrolled with medications, heavy vaginal bleeding, or inability to void.

## 2021-05-24 NOTE — DISCHARGE NOTE PROVIDER - NSDCCPCAREPLAN_GEN_ALL_CORE_FT
PRINCIPAL DISCHARGE DIAGNOSIS  Diagnosis: Vaginal bleeding  Assessment and Plan of Treatment:       SECONDARY DISCHARGE DIAGNOSES  Diagnosis: Anemia  Assessment and Plan of Treatment:

## 2021-05-25 ENCOUNTER — TRANSCRIPTION ENCOUNTER (OUTPATIENT)
Age: 61
End: 2021-05-25

## 2021-05-25 LAB
ANION GAP SERPL CALC-SCNC: 10 MMOL/L — SIGNIFICANT CHANGE UP (ref 7–14)
BASOPHILS # BLD AUTO: 0.05 K/UL — SIGNIFICANT CHANGE UP (ref 0–0.2)
BASOPHILS # BLD AUTO: 0.07 K/UL — SIGNIFICANT CHANGE UP (ref 0–0.2)
BASOPHILS NFR BLD AUTO: 0.6 % — SIGNIFICANT CHANGE UP (ref 0–2)
BASOPHILS NFR BLD AUTO: 0.8 % — SIGNIFICANT CHANGE UP (ref 0–2)
BUN SERPL-MCNC: 13 MG/DL — SIGNIFICANT CHANGE UP (ref 7–23)
CALCIUM SERPL-MCNC: 8.4 MG/DL — SIGNIFICANT CHANGE UP (ref 8.4–10.5)
CHLORIDE SERPL-SCNC: 109 MMOL/L — HIGH (ref 98–107)
CO2 SERPL-SCNC: 22 MMOL/L — SIGNIFICANT CHANGE UP (ref 22–31)
CREAT SERPL-MCNC: 0.69 MG/DL — SIGNIFICANT CHANGE UP (ref 0.5–1.3)
EOSINOPHIL # BLD AUTO: 0.3 K/UL — SIGNIFICANT CHANGE UP (ref 0–0.5)
EOSINOPHIL # BLD AUTO: 0.38 K/UL — SIGNIFICANT CHANGE UP (ref 0–0.5)
EOSINOPHIL NFR BLD AUTO: 3.6 % — SIGNIFICANT CHANGE UP (ref 0–6)
EOSINOPHIL NFR BLD AUTO: 4.2 % — SIGNIFICANT CHANGE UP (ref 0–6)
GLUCOSE SERPL-MCNC: 102 MG/DL — HIGH (ref 70–99)
HCT VFR BLD CALC: 25.4 % — LOW (ref 34.5–45)
HCT VFR BLD CALC: 26.5 % — LOW (ref 34.5–45)
HGB BLD-MCNC: 8.2 G/DL — LOW (ref 11.5–15.5)
HGB BLD-MCNC: 8.7 G/DL — LOW (ref 11.5–15.5)
IANC: 5.54 K/UL — SIGNIFICANT CHANGE UP (ref 1.5–8.5)
IANC: 5.76 K/UL — SIGNIFICANT CHANGE UP (ref 1.5–8.5)
IMM GRANULOCYTES NFR BLD AUTO: 0.6 % — SIGNIFICANT CHANGE UP (ref 0–1.5)
IMM GRANULOCYTES NFR BLD AUTO: 0.8 % — SIGNIFICANT CHANGE UP (ref 0–1.5)
LYMPHOCYTES # BLD AUTO: 1.81 K/UL — SIGNIFICANT CHANGE UP (ref 1–3.3)
LYMPHOCYTES # BLD AUTO: 2.2 K/UL — SIGNIFICANT CHANGE UP (ref 1–3.3)
LYMPHOCYTES # BLD AUTO: 21.7 % — SIGNIFICANT CHANGE UP (ref 13–44)
LYMPHOCYTES # BLD AUTO: 24.1 % — SIGNIFICANT CHANGE UP (ref 13–44)
MAGNESIUM SERPL-MCNC: 1.9 MG/DL — SIGNIFICANT CHANGE UP (ref 1.6–2.6)
MCHC RBC-ENTMCNC: 28.8 PG — SIGNIFICANT CHANGE UP (ref 27–34)
MCHC RBC-ENTMCNC: 29 PG — SIGNIFICANT CHANGE UP (ref 27–34)
MCHC RBC-ENTMCNC: 32.3 GM/DL — SIGNIFICANT CHANGE UP (ref 32–36)
MCHC RBC-ENTMCNC: 32.8 GM/DL — SIGNIFICANT CHANGE UP (ref 32–36)
MCV RBC AUTO: 87.7 FL — SIGNIFICANT CHANGE UP (ref 80–100)
MCV RBC AUTO: 89.8 FL — SIGNIFICANT CHANGE UP (ref 80–100)
MONOCYTES # BLD AUTO: 0.59 K/UL — SIGNIFICANT CHANGE UP (ref 0–0.9)
MONOCYTES # BLD AUTO: 0.63 K/UL — SIGNIFICANT CHANGE UP (ref 0–0.9)
MONOCYTES NFR BLD AUTO: 6.9 % — SIGNIFICANT CHANGE UP (ref 2–14)
MONOCYTES NFR BLD AUTO: 7.1 % — SIGNIFICANT CHANGE UP (ref 2–14)
NEUTROPHILS # BLD AUTO: 5.54 K/UL — SIGNIFICANT CHANGE UP (ref 1.8–7.4)
NEUTROPHILS # BLD AUTO: 5.76 K/UL — SIGNIFICANT CHANGE UP (ref 1.8–7.4)
NEUTROPHILS NFR BLD AUTO: 63.2 % — SIGNIFICANT CHANGE UP (ref 43–77)
NEUTROPHILS NFR BLD AUTO: 66.4 % — SIGNIFICANT CHANGE UP (ref 43–77)
NRBC # BLD: 0 /100 WBCS — SIGNIFICANT CHANGE UP
NRBC # BLD: 0 /100 WBCS — SIGNIFICANT CHANGE UP
NRBC # FLD: 0 K/UL — SIGNIFICANT CHANGE UP
NRBC # FLD: 0.03 K/UL — HIGH
PHOSPHATE SERPL-MCNC: 3.8 MG/DL — SIGNIFICANT CHANGE UP (ref 2.5–4.5)
PLATELET # BLD AUTO: 110 K/UL — LOW (ref 150–400)
PLATELET # BLD AUTO: 116 K/UL — LOW (ref 150–400)
POTASSIUM SERPL-MCNC: 4.1 MMOL/L — SIGNIFICANT CHANGE UP (ref 3.5–5.3)
POTASSIUM SERPL-SCNC: 4.1 MMOL/L — SIGNIFICANT CHANGE UP (ref 3.5–5.3)
RBC # BLD: 2.83 M/UL — LOW (ref 3.8–5.2)
RBC # BLD: 3.02 M/UL — LOW (ref 3.8–5.2)
RBC # FLD: 16.5 % — HIGH (ref 10.3–14.5)
RBC # FLD: 16.8 % — HIGH (ref 10.3–14.5)
SODIUM SERPL-SCNC: 141 MMOL/L — SIGNIFICANT CHANGE UP (ref 135–145)
WBC # BLD: 8.34 K/UL — SIGNIFICANT CHANGE UP (ref 3.8–10.5)
WBC # BLD: 9.11 K/UL — SIGNIFICANT CHANGE UP (ref 3.8–10.5)
WBC # FLD AUTO: 8.34 K/UL — SIGNIFICANT CHANGE UP (ref 3.8–10.5)
WBC # FLD AUTO: 9.11 K/UL — SIGNIFICANT CHANGE UP (ref 3.8–10.5)

## 2021-05-25 PROCEDURE — 74176 CT ABD & PELVIS W/O CONTRAST: CPT | Mod: 26

## 2021-05-25 PROCEDURE — ZZZZZ: CPT

## 2021-05-25 PROCEDURE — 99231 SBSQ HOSP IP/OBS SF/LOW 25: CPT | Mod: 57

## 2021-05-25 RX ORDER — MAGNESIUM OXIDE 400 MG ORAL TABLET 241.3 MG
400 TABLET ORAL ONCE
Refills: 0 | Status: COMPLETED | OUTPATIENT
Start: 2021-05-25 | End: 2021-05-25

## 2021-05-25 RX ORDER — SODIUM CHLORIDE 9 MG/ML
1000 INJECTION, SOLUTION INTRAVENOUS
Refills: 0 | Status: DISCONTINUED | OUTPATIENT
Start: 2021-05-25 | End: 2021-05-27

## 2021-05-25 RX ADMIN — MAGNESIUM OXIDE 400 MG ORAL TABLET 400 MILLIGRAM(S): 241.3 TABLET ORAL at 09:39

## 2021-05-25 RX ADMIN — PANTOPRAZOLE SODIUM 40 MILLIGRAM(S): 20 TABLET, DELAYED RELEASE ORAL at 06:25

## 2021-05-25 NOTE — PROGRESS NOTE ADULT - PROBLEM SELECTOR PLAN 1
Fellow Note    Patient seen and examined. Agree with above.    VS reviewed  Labs reviewed    NPO at midnight  AM labs  T+S, 2u on hold for OR  Plan for ELAP EDISON BSO. Discussed CT findings showing enlarged uterus precluding MIS approach. Appreciate IR recs, no indication for IVC filter.      Marley HENRIQUEZ

## 2021-05-25 NOTE — PROGRESS NOTE ADULT - ASSESSMENT
61yo HD#5 with simple hyperplasia with atypia admitted w/symptomatic anemia 2/2 vaginal bleeding, s/p total 5u PRBC. Pt hemodynamically stable at this time.     Neuro: Tylenol, Oxy PRN for pain  CV: Hemodynamically stable, Hgb 6.6->3u->9.1->8.9->7.9->1u->9.9-.8.1->1u->9.5; monitor VS; f/u AM CBC  Pulm: O2 sat WNL on RA  FEN: SLIV, f/u AM BMP, replete lytes PRN, Reg diet  GI: Protonix  : Voiding freely, strict I&O, monitor pad counts; Megace 40mg BID for vaginal bleeding  Heme: LE Doppler (5/24): b/l LE below the knee DVT; review with IR IVC filter placement; pt at high risk for further VTE in setting of hospitalization, lack of OOB 2/2 symptomatic anemia, inability to AC 2/2 heavy AUB, surgical candidate  ID: Afebrile, No signs of infection  Endo: no active issues  Dispo: inpatient care    Brandy Altamirano R2

## 2021-05-25 NOTE — CONSULT NOTE ADULT - ATTENDING COMMENTS
agree with note and plan as above
61yo P3 postmenopausal with simple hyperplasia with atypia (dx 4/16/21 on D&C) presenting with c/o symptomatic anemia in the setting of ongoing vaginal bleeding. Given menorrhagia, hold off AC at this time given location of DVTs as stated above.

## 2021-05-25 NOTE — CONSULT NOTE ADULT - SUBJECTIVE AND OBJECTIVE BOX
----------------------------------------------------------  Interventional Radiology Brief Consult Note  -----------------------------------------------------------    Reason for Referral:   IVC filter placement.     Clinical Summary: 60y Female with postmenopausal bleeding and syncope requiring blood transfusions. Patient was found to have bilateral below the knee DVTs and superificial thrombi in the GSV. IR is consulted for placement of an IVC filter.     Vitals:  T(C): 37.1 (05-25-21 @ 06:21), Max: 37.1 (05-24-21 @ 11:40)  HR: 90 (05-25-21 @ 06:21) (85 - 93)  BP: 104/67 (05-25-21 @ 06:21) (101/66 - 111/65)  RR: 18 (05-25-21 @ 06:21) (17 - 20)  SpO2: 100% (05-25-21 @ 06:21) (100% - 100%)    Labs:           8.7  9.11)-----(116     (05-25-21 @ 06:47)         26.5     141 | 109 | 13  --------------------< 102     (05-25-21 @ 06:47)  4.1 | 22 | 0.69         Assessment: 60y Female with post menopausal bleeding with plan for hysterectomy and bilateral below the knee DVTs. IR is consulted for IVC filter placement.    Recommendations:  - Below the knee thrombi and superficial GSV thrombus are not an indication for placement of an IVC filter at this time. No indication for IVC filter placement.     
Hematology Consult Note    HPI: 61yo P3 postmenopausal with simple hyperplasia with atypia (dx 21 on D&C) presenting with c/o symptomatic anemia. Pt reports PMB since 2021 in the setting of using Estradiol 1% patch x5 years for menopausal symptoms; she was not using progesterone concurrently. She was seen by GYN Dr. King, who performed an EMB (3/23/21) and D&C (21), the latter of which was notable for simple hyperplasia with atypia. After D&C, patient reports starting 2 week course of Megace with 1 refill for continued vaginal bleeding. She was to take 1 pill/day, and reports that bleeding was controlled; she would only see vaginal bleeding when using the toilet. She was seen by Dr. Baekr for second opinion and was then referred to Dr. Hewitt for consultation on 21. At that time, she had discontinued Megace x3 days and subsequently had increased bleeding. She was instructed plan was made for RA hysterectomy, bilateral salpingo-oophorectomy, poss staging on 6/3. She was to stop all HRT in preparation for surgery.    Today, she presents to the ED with near syncopal episode at home when going to use shower. She denies loss of consciousness or fall. She reports having similar episodes throughout the day yesterday, associated with cold sweats, and would take frequent naps and rest to avoid LOC. She additionally restarted her Megace this AM to help with the vaginal bleeding. She reports stacking 3-4 pads and changing [id number] 2/2 saturation. She additionally notes occasionally using toilet paper in addition to the pads. She notes LLQ cramping discomfort, has not used analgesia, current pain /10.  She reports palpitations. Denies CP, SOB, HA, nausea/vomiting, fevers/chills. Last drank a smoothie at 10:00AM.    (21 May 2021 18:33)    She reported lower extremity tingling / discomfort, so the primary team obtained bilateral venous duplex studies, which revealed bilateral below the knee DVTs. Hematology consulted to provide recommendations on treatment given ongoing vaginal bleeding. Tentative plan is for hysterectomy given symptomatic anemia 2/2 blood loss. Patient denies any personal hx of clotting disorders.      PAST MEDICAL & SURGICAL HISTORY:  Bunion of left foot    Bunion of right foot    Closed bimalleolar fracture of left ankle, initial encounter  , s/p fall    Seasonal allergies    Sinusitis, chronic    Swollen ankles  left    Toe deformity, left  big toe    Insufficiency of tear film of both eyes    Pterygium eye, right    Uterine polyp    Menopause    Obese    Palpitations    S/P ORIF (open reduction internal fixation) fracture  left ankle with hardware-     S/P bunionectomy  left -,  right -     S/P hardware removal  left ankle-    H/O 1           FAMILY HISTORY:  Diabetes mellitus, type 2 (Mother)        MEDICATIONS  (STANDING):  megestrol 40 milliGRAM(s) Oral every 12 hours  pantoprazole    Tablet 40 milliGRAM(s) Oral before breakfast    MEDICATIONS  (PRN):  acetaminophen   Tablet .. 650 milliGRAM(s) Oral every 6 hours PRN Mild Pain (1 - 3), Moderate Pain (4 - 6)  oxyCODONE    IR 5 milliGRAM(s) Oral every 6 hours PRN Severe Pain (7 - 10)      Allergies:  No Known Allergies / Intolerances      REVIEW OF SYSTEMS: ROS otherwise negative    T(F): 98.5 (21 @ 15:15), Max: 98.8 (21 @ 11:40)  HR: 91 (21 @ 15:15)  BP: 101/66 (21 @ 15:15)  RR: 18 (21 @ 15:15)  SpO2: 100% (21 @ 15:15)  Wt(kg): --    Physical exam from primary team note    Gen: awake, alert, NAD  Pulm: CTAB, nonlabored breathing  CV: Normal S1/S2, tachycardic   Abd: soft, nontender, nondistended; no rebound/guarding  : NEFG  Speculum: moderate watery blood in vault, evacuated; no active bleeding from os; polypoid tissue at os  Bimanual: nontender fundus/adnexa b/l  Ext: nontender, nonedematous                          9.5    8.75  )-----------( 112      ( 24 May 2021 15:16 )             28.7           140  |  110<H>  |  14  ----------------------------<  131<H>  3.9   |  21<L>  |  0.75    Ca    8.3<L>      24 May 2021 05:54  Phos  3.0       Mg     2.0             Phosphorus Level, Serum: 3.0 mg/dL ( @ 05:54)  Magnesium, Serum: 2.0 mg/dL ( @ 05:54)    < from: US Duplex Venous Lower Ext Complete, Bilateral (21 @ 14:08) >    EXAM:  US DPLX LWR EXT VEINS COMPL BI      PROCEDURE DATE:  May 24 2021     INTERPRETATION:  CLINICAL INFORMATION: Lower extremity pain. Vaginal bleeding.    COMPARISON: None available.    TECHNIQUE: Duplex sonography of the BILATERAL LOWER extremity veins with color and spectral Doppler, with and without compression.    FINDINGS:    RIGHT:  Normal compressibility of the RIGHT common femoral, femoral and popliteal veins.  Doppler examination shows normal spontaneous and phasic flow.  Deepvenous thrombosis is present in the right gastrocnemius vein. Deep venous thrombus is also present in the posterior tibial and soleal veins.    An additional superficial thrombus is identified in the right thigh, possibly a branch of the greater saphenous vein.    LEFT:  Normal compressibility of the LEFT common femoral, femoral and popliteal veins.  Doppler examination shows normal spontaneous and phasic flow.  Calf vein thrombus is present in the left soleal and peroneal veins.    An additionalsuperficial thrombus is present in the left thigh, possibly a branch of the greater saphenous vein.    IMPRESSION:  No evidence of deep venous thrombosis from the level of the bilateral common femoral veins to the popliteal veins.    Acute deep venous thrombosis in the bilateral calf veins (below the knee) involving the right gastrocnemius, posterior tibial, and soleal veins, as well as the left soleal and peroneal veins.    Superficial thrombus in the bilateral thighs, possibly branches related to the greater saphenous veins.    Findings were discussed with HENRY Beard2021 2:38 PM by Dr. Orlando with read back confirmation.    YANIV ORLANDO MD; Attending Radiologist  This document has been electronically signed. May 24 2021  2:45PM    < end of copied text >  < from: US Transvaginal (21 @ 15:23) >    EXAM:  US TRANSVAGINAL      PROCEDURE DATE:  May 21 2021     INTERPRETATION:  CLINICAL INFORMATION: Vaginal bleeding status post D&C.    LMP: N/A    COMPARISON: None available.    TECHNIQUE:  Endovaginal pelvic sonogram only.    FINDINGS:    Uterus: 13.0 cm x 9.4 cm x 10.4 cm. subserosal uterine myoma measuring up to 3.1 cm on the left and 2.0 cm posteriorly.  Endometrium: 24 mm. Thickened with regions of increased vascularity.    Right ovary: 2.5 cm x 1.2 cm x 1.9 cm. Within normal limits.  Left ovary: Not visualized.    Fluid: None.    IMPRESSION:  Thickened endometrial complex with regions of increased vascularity.  Myomatous uterus.    YANIV ORLANDO MD; Attending Radiologist  This document has been electronically signed. May 21 2021  3:45PM    < end of copied text >

## 2021-05-25 NOTE — PROGRESS NOTE ADULT - SUBJECTIVE AND OBJECTIVE BOX
Aiden Pinzon is a 77 y.o. female and presents for Annual Medicare Wellness Visit. Assessment of cognitive impairment: Alert and oriented x 3. Depression Screen: PHQ over the last two weeks 11/8/2018 Little interest or pleasure in doing things Not at all Feeling down, depressed, irritable, or hopeless Not at all Total Score PHQ 2 0 Trouble falling or staying asleep, or sleeping too much - Feeling tired or having little energy - Poor appetite, weight loss, or overeating - Feeling bad about yourself - or that you are a failure or have let yourself or your family down - Trouble concentrating on things such as school, work, reading, or watching TV - Moving or speaking so slowly that other people could have noticed; or the opposite being so fidgety that others notice - Thoughts of being better off dead, or hurting yourself in some way -  
PHQ 9 Score - Fall Risk Assessment:   
Fall Risk Assessment, last 12 mths 11/8/2018 Able to walk? Yes Fall in past 12 months? No  
Fall with injury? -  
Number of falls in past 12 months - Fall Risk Score -  
 
 
Abuse Screen:  
Abuse Screening Questionnaire 11/8/2018 Do you ever feel afraid of your partner? Katie Vazquez Are you in a relationship with someone who physically or mentally threatens you? Katie Vazquez Is it safe for you to go home? Zach Barton Activities of Daily Living: 
Self-care. Requires assistance with: no ADLs Patient handle his/her own medications  yes Use of pill box  yes Activities of Daily Living: ADL Assessment 11/8/2018 Feeding yourself No Help Needed Getting from bed to chair No Help Needed Getting dressed No Help Needed Bathing or showering No Help Needed Walk across the room (includes cane/walker) No Help Needed Using the telphone No Help Needed Taking your medications No Help Needed Preparing meals No Help Needed Managing money (expenses/bills) No Help Needed GYN ONC Progress Note    Pt seen and examined at bedside. Yesterday patient received additional 1u PRBC. LE Dopplers revealed below the knee DVT.  No overnight events. This AM, no acute complaints. Pain well controlled on current regimen. Changed saturated 2-pad-chux combo 2x overnight.  Tolerating regular diet.  Able to ambulate without lightheadedness. Voiding freely.   Denies SOB/CP/palpitations, fever/chills, nausea/emesis.     Vital Signs Last 24 Hrs  T(C): 36.7 (25 May 2021 01:50), Max: 37.1 (24 May 2021 11:40)  T(F): 98 (25 May 2021 01:50), Max: 98.8 (24 May 2021 11:40)  HR: 93 (25 May 2021 01:50) (85 - 93)  BP: 103/66 (25 May 2021 01:50) (97/58 - 111/65)  RR: 17 (25 May 2021 01:50) (17 - 20)  SpO2: 100% (25 May 2021 01:50) (100% - 100%)    05-23 @ 07:01  -  05-24 @ 07:00  --------------------------------------------------------  IN: 240 mL / OUT: 1200 mL / NET: -960 mL    05-24 @ 07:01  -  05-25 @ 05:14  --------------------------------------------------------  IN: 1020 mL / OUT: 1300 mL / NET: -280 mL      PHYSICAL EXAM:  Gen: NAD, A+O x 3  CV: Normal S1/S2, RRR  Pulm: CTA BL  Abd: Soft, appropriately tender, non distended, no rebound or guarding, +BS  : saturated perineal pad x2  Extremities: No swelling or calf tenderness    Labs, additional tests:             9.5    8.75  )-----------( 112      ( 05-24 @ 15:16 )             28.7                8.1    8.62  )-----------( 125      ( 05-24 @ 05:54 )             25.5                9.9    9.87  )-----------( 150      ( 05-23 @ 16:04 )             29.9                7.9    10.24 )-----------( 144      ( 05-23 @ 07:15 )             24.0                8.9    12.49 )-----------( 164      ( 05-22 @ 18:24 )             27.1                9.1    11.74 )-----------( 175      ( 05-22 @ 11:41 )             28.5       05-24    140  |  110<H>  |  14  ----------------------------<  131<H>  3.9   |  21<L>  |  0.75    Ca    8.3<L>      24 May 2021 05:54  Phos  3.0     05-24  Mg     2.0     05-24        MEDICATIONS  (STANDING):  pantoprazole    Tablet 40 milliGRAM(s) Oral before breakfast    MEDICATIONS  (PRN):  acetaminophen   Tablet .. 650 milliGRAM(s) Oral every 6 hours PRN Mild Pain (1 - 3), Moderate Pain (4 - 6)  oxyCODONE    IR 5 milliGRAM(s) Oral every 6 hours PRN Severe Pain (7 - 10)   Moderately strenuous housework (laundry) No Help Needed Shopping for personal items (toiletries/medicines) No Help Needed Shopping for groceries No Help Needed Driving No Help Needed Climbing a flight of stairs No Help Needed Getting to places beyond walking distances No Help Needed Health Maintenance:Daily Aspirin: recommended to start daily 81mg Bone Density:  Last done in 2016 Glaucoma Screening: yes-scheduled for 12/2018 Immunizations:  
 Tetanus: 07/14/2014. Influenza: 10/11/2018. Shingles: 05/27/2014  PPSV-23: will give first dose today. Prevnar-13: 08/16/2017 Cancer screening:  
 Cervical: patient states she no longer gets . Breast: done by OBGYN/request records for 2017, 2018  Colon: 05/02/2017. Prostate:  N/A Alcohol Risk Screen: On any occasion during the past 3 months, have you had more than 3 drinks(female) or 4 drinks (male) containing alcohol in one? Yes Do you average more than 7 drinks (female) or 14 drinks (male) per week? Yes Type and amount:5 Glasses of wine Hearing Loss: 
denies any hearing loss wears hearing aides Vision Loss:  
Wears glasses, contact lenses, or have any other visual impairment  yes Adult Nutrition Screen: No risk factors noted. Advance Care Planning: End of Life Planning: has an advanced directive - a copy HAS NOT been provided. Archie Duarte 127 ACP-Facilitator appointment yes Medications/Allergies: Reviewed with patient Prior to Admission medications Medication Sig Start Date End Date Taking? Authorizing Provider  
hydrocortisone (HYTONE) 2.5 % topical cream Apply  to affected area two (2) times a day for 30 days. use thin layer on dry area for 5 days.  10/23/18 11/22/18 Yes Luis Angel Solomon MD  
traZODone (DESYREL) 150 mg tablet TAKE 1 TABLET NIGHTLY (NEED APPOINTMENT FOR ADDITIONAL REFILLS FOR CHRONIC MEDICAL CARE MANAGEMENT) 7/25/18  Yes Lorna Ortez NP  
 atorvastatin (LIPITOR) 10 mg tablet TAKE 1 TABLET DAILY 18  Yes Gabino Arana MD  
clotrimazole (LOTRIMIN) 1 % topical cream Apply  to affected area two (2) times a day. 17  Yes Karen GOOD NP  
desonide (DESOWEN) 0.05 % topical ointment Apply  to affected area two (2) times a day. 17  Yes Gabino Arana MD  
aspirin delayed-release 81 mg tablet Take 81 mg by mouth daily. Yes Provider, Historical  
calcium 500 mg Tab Take 2 Tabs by mouth daily. Yes Provider, Historical  
Cholecalciferol, Vitamin D3, (VITAMIN D) 1,000 unit Cap Take 1 Tab by mouth daily. Yes Provider, Historical  
fish oil-dha-epa (FISH OIL) 1,200-144-216 mg Cap Take 2 Caps by mouth daily. Yes Provider, Historical  
ALPRAZolam (XANAX) 0.5 mg tablet Take 1 Tab by mouth nightly as needed for Anxiety for up to 15 doses. Max Daily Amount: 0.5 mg. 5/10/17   Gabino Arana MD  
 
 
No Known Allergies Objective: 
Visit Vitals /77 (BP 1 Location: Left arm, BP Patient Position: Sitting) Pulse 80 Temp 98.4 °F (36.9 °C) (Oral) Resp 16 Ht 5' 2\" (1.575 m) Wt 134 lb 6.4 oz (61 kg) SpO2 98% BMI 24.58 kg/m² Body mass index is 24.58 kg/m². Problem List: Reviewed with patient and discussed risk factors. Patient Active Problem List  
Diagnosis Code  Osteopenia M85.80  Arthritis M19.90  
 Hyperlipidemia E78.5  Mild depression (HCC) F32.0  
 
 
PSH: Reviewed with patient Past Surgical History:  
Procedure Laterality Date  ENDOSCOPY, COLON, DIAGNOSTIC  3/7/2012/  
 repeat in 5 years due family hx  HX GYN    
   HX ORTHOPAEDIC    
 rotator cuff tear SH: Reviewed with patient Social History Tobacco Use  Smoking status: Never Smoker  Smokeless tobacco: Never Used Substance Use Topics  Alcohol use: Yes Comment: socially  Drug use: No  
 
 
FH: Reviewed with patient Family History Problem Relation Age of Onset  Hypertension Mother  Stroke Mother  Cancer Father   
     leukemia Current medical providers:   
Patient Care Team: 
Wyatt Jaimes MD as PCP - General (Internal Medicine) Sheridan Saldivar MD (Cardiology) Plan:   
 
Diagnoses and all orders for this visit: 
 
Medicare annual wellness visit, subsequent Immunization & Health screening discussed. ACP (advance care planning) She has an Advanced directive. Will bring a copy for our records. Osteopenia of multiple sites -     DEXA BONE DENSITY STUDY AXIAL; Future Encounter for immunization 
-     PNEUMOCOCCAL POLYSACCHARIDE VACCINE, 23-VALENT, ADULT OR IMMUNOSUPPRESSED PT DOSE, Health Maintenance Topic Date Due  Shingrix Vaccine Age 50> (1 of 2) 01/04/2002  GLAUCOMA SCREENING Q2Y  01/04/2017  BREAST CANCER SCRN MAMMOGRAM  07/15/2018  Pneumococcal 65+ Low/Medium Risk (2 of 2 - PPSV23) 08/16/2018  MEDICARE YEARLY EXAM  10/11/2018  DTaP/Tdap/Td series (2 - Td) 07/24/2023  Hepatitis C Screening  Completed  Bone Densitometry (Dexa) Screening  Completed  Influenza Age 5 to Adult  Completed Urinary/ Fecal Incontinence: Denies Regular physical exercise:  Daily consists of yoga, strength training, cardio, goes to the gym. Patient verbalized understanding of information presented. AVS and Medicare Part B Preventive Services Table printed and given to pt and reviewed. See table for findings under Recommendation and Scheduled. All of the patient's questions were answered. After obtaining consent, and per orders of Dr. Klesey Mcgee , injection of Pneumovax 23 0.5 ml IM R deltoid was given by Rebecca Mosquera LPN. Patient instructed to remain in clinic for 20 minutes afterwards, and to report any adverse reaction to me immediately. No complaints voiced. Patient tolerated well. See immunization record for vaccine specifics. GYN ONC Progress Note    Pt seen and examined at bedside. Yesterday patient received additional 1u PRBC. LE Dopplers revealed below the knee DVT.  No overnight events. This AM, no acute complaints. Reports feeling improved, less lightheaded with movement.   Pain well controlled on current regimen. Changed saturated 2-pad-chux combo 2x overnight; current pads in place since midnight. Tolerating regular diet. Voiding freely.   Denies SOB/CP/palpitations, fever/chills, nausea/emesis.     Vital Signs Last 24 Hrs  T(C): 36.7 (25 May 2021 01:50), Max: 37.1 (24 May 2021 11:40)  T(F): 98 (25 May 2021 01:50), Max: 98.8 (24 May 2021 11:40)  HR: 93 (25 May 2021 01:50) (85 - 93)  BP: 103/66 (25 May 2021 01:50) (97/58 - 111/65)  RR: 17 (25 May 2021 01:50) (17 - 20)  SpO2: 100% (25 May 2021 01:50) (100% - 100%)    05-23 @ 07:01  -  05-24 @ 07:00  --------------------------------------------------------  IN: 240 mL / OUT: 1200 mL / NET: -960 mL    05-24 @ 07:01  -  05-25 @ 05:14  --------------------------------------------------------  IN: 1020 mL / OUT: 1300 mL / NET: -280 mL      PHYSICAL EXAM:  Gen: NAD, A+O x 3  CV: Normal S1/S2, RRR  Pulm: CTA BL  Abd: Soft, appropriately tender, non distended, no rebound or guarding, +BS  : saturated perineal pad x2  Extremities: No swelling or calf tenderness    Labs, additional tests:             9.5    8.75  )-----------( 112      ( 05-24 @ 15:16 )             28.7                8.1    8.62  )-----------( 125      ( 05-24 @ 05:54 )             25.5                9.9    9.87  )-----------( 150      ( 05-23 @ 16:04 )             29.9                7.9    10.24 )-----------( 144      ( 05-23 @ 07:15 )             24.0                8.9    12.49 )-----------( 164      ( 05-22 @ 18:24 )             27.1                9.1    11.74 )-----------( 175      ( 05-22 @ 11:41 )             28.5       05-24    140  |  110<H>  |  14  ----------------------------<  131<H>  3.9   |  21<L>  |  0.75    Ca    8.3<L>      24 May 2021 05:54  Phos  3.0     05-24  Mg     2.0     05-24        MEDICATIONS  (STANDING):  pantoprazole    Tablet 40 milliGRAM(s) Oral before breakfast    MEDICATIONS  (PRN):  acetaminophen   Tablet .. 650 milliGRAM(s) Oral every 6 hours PRN Mild Pain (1 - 3), Moderate Pain (4 - 6)  oxyCODONE    IR 5 milliGRAM(s) Oral every 6 hours PRN Severe Pain (7 - 10)

## 2021-05-25 NOTE — CHART NOTE - NSCHARTNOTEFT_GEN_A_CORE
Patient seen and evaluated at bedside. Overall, patient is doing well. Lightheadedness has improved. She is tolerating a regular diet. Pain well controlled on current regimen.     CTAP(5/25): 2 indeterminate R breast nodules (1.2, 2.1x1.2cm). Enlarged uterus w/ myomas. Endometrial cavity distended to 28mm w/ blood. Underlying mass cannot be excluded. No bowel obstruction. No ascites. L internal iliac lymph node measuring 0.9x0.8cm. Small fat-containing umbilical hernia.    Dopplers (5/24): Acute deep venous thrombosis in the bilateral calf veins (below the knee) involving the right gastrocnemius, posterior tibial, and soleal veins, as well as the left soleal and peroneal veins. Superficial thrombus in the bilateral thighs, possibly branches related to the greater saphenous veins    Plan  - Per IR, IVC filter not indicated as patient's blood clots are below the knee  - Patient on OR schedule tomorrow 5/26 at 730a  - NPO/LR@125 at midnight  - 4AM CBC/BMP/Mg/Phos/T&S ordered   - 2u PRBC on hold for OR    D/w Dr. Roach PGY6  Eveline Lopez PGY1 Patient seen and evaluated at bedside. Overall, patient is doing well. Lightheadedness has improved. She is tolerating a regular diet. Pain well controlled on current regimen.     CTAP(5/25): 2 indeterminate R breast nodules (1.2, 2.1x1.2cm). Enlarged uterus w/ myomas. Endometrial cavity distended to 28mm w/ blood. Underlying mass cannot be excluded. No bowel obstruction. No ascites. L internal iliac lymph node measuring 0.9x0.8cm. Small fat-containing umbilical hernia.    Dopplers (5/24): Acute deep venous thrombosis in the bilateral calf veins (below the knee) involving the right gastrocnemius, posterior tibial, and soleal veins, as well as the left soleal and peroneal veins. Superficial thrombus in the bilateral thighs, possibly branches related to the greater saphenous veins    Plan  - Per IR, IVC filter not indicated as patient's blood clots are below the knee  - Patient on OR schedule tomorrow 5/26 at 730a  - NPO/LR@125 at midnight  - 4AM CBC/BMP/Mg/Phos/Coags/T&S ordered   - 2u PRBC on hold for OR    D/w Dr. Roach PGY6  Eveline Lopez PGY1

## 2021-05-26 ENCOUNTER — APPOINTMENT (OUTPATIENT)
Dept: GYNECOLOGIC ONCOLOGY | Facility: HOSPITAL | Age: 61
End: 2021-05-26

## 2021-05-26 ENCOUNTER — RESULT REVIEW (OUTPATIENT)
Age: 61
End: 2021-05-26

## 2021-05-26 LAB
A1C WITH ESTIMATED AVERAGE GLUCOSE RESULT: 5.2 % — SIGNIFICANT CHANGE UP (ref 4–5.6)
ANION GAP SERPL CALC-SCNC: 13 MMOL/L — SIGNIFICANT CHANGE UP (ref 7–14)
APTT BLD: 27.9 SEC — SIGNIFICANT CHANGE UP (ref 27–36.3)
BASOPHILS # BLD AUTO: 0.03 K/UL — SIGNIFICANT CHANGE UP (ref 0–0.2)
BASOPHILS # BLD AUTO: 0.06 K/UL — SIGNIFICANT CHANGE UP (ref 0–0.2)
BASOPHILS NFR BLD AUTO: 0.3 % — SIGNIFICANT CHANGE UP (ref 0–2)
BASOPHILS NFR BLD AUTO: 0.6 % — SIGNIFICANT CHANGE UP (ref 0–2)
BLD GP AB SCN SERPL QL: NEGATIVE — SIGNIFICANT CHANGE UP
BUN SERPL-MCNC: 12 MG/DL — SIGNIFICANT CHANGE UP (ref 7–23)
CALCIUM SERPL-MCNC: 8.2 MG/DL — LOW (ref 8.4–10.5)
CHLORIDE SERPL-SCNC: 108 MMOL/L — HIGH (ref 98–107)
CO2 SERPL-SCNC: 20 MMOL/L — LOW (ref 22–31)
CREAT SERPL-MCNC: 0.74 MG/DL — SIGNIFICANT CHANGE UP (ref 0.5–1.3)
EOSINOPHIL # BLD AUTO: 0.01 K/UL — SIGNIFICANT CHANGE UP (ref 0–0.5)
EOSINOPHIL # BLD AUTO: 0.3 K/UL — SIGNIFICANT CHANGE UP (ref 0–0.5)
EOSINOPHIL NFR BLD AUTO: 0.1 % — SIGNIFICANT CHANGE UP (ref 0–6)
EOSINOPHIL NFR BLD AUTO: 3 % — SIGNIFICANT CHANGE UP (ref 0–6)
ESTIMATED AVERAGE GLUCOSE: 103 MG/DL — SIGNIFICANT CHANGE UP (ref 68–114)
FIBRINOGEN PPP-MCNC: 523 MG/DL — HIGH (ref 290–520)
GLUCOSE BLDC GLUCOMTR-MCNC: 103 MG/DL — HIGH (ref 70–99)
GLUCOSE SERPL-MCNC: 106 MG/DL — HIGH (ref 70–99)
HCT VFR BLD CALC: 24.3 % — LOW (ref 34.5–45)
HCT VFR BLD CALC: 24.8 % — LOW (ref 34.5–45)
HGB BLD-MCNC: 8.1 G/DL — LOW (ref 11.5–15.5)
HGB BLD-MCNC: 8.3 G/DL — LOW (ref 11.5–15.5)
IANC: 6.72 K/UL — SIGNIFICANT CHANGE UP (ref 1.5–8.5)
IANC: 9.16 K/UL — HIGH (ref 1.5–8.5)
IMM GRANULOCYTES NFR BLD AUTO: 0.5 % — SIGNIFICANT CHANGE UP (ref 0–1.5)
IMM GRANULOCYTES NFR BLD AUTO: 0.7 % — SIGNIFICANT CHANGE UP (ref 0–1.5)
INR BLD: 1.17 RATIO — HIGH (ref 0.88–1.16)
LYMPHOCYTES # BLD AUTO: 0.79 K/UL — LOW (ref 1–3.3)
LYMPHOCYTES # BLD AUTO: 2.21 K/UL — SIGNIFICANT CHANGE UP (ref 1–3.3)
LYMPHOCYTES # BLD AUTO: 22.2 % — SIGNIFICANT CHANGE UP (ref 13–44)
LYMPHOCYTES # BLD AUTO: 7.5 % — LOW (ref 13–44)
MAGNESIUM SERPL-MCNC: 1.9 MG/DL — SIGNIFICANT CHANGE UP (ref 1.6–2.6)
MCHC RBC-ENTMCNC: 29.6 PG — SIGNIFICANT CHANGE UP (ref 27–34)
MCHC RBC-ENTMCNC: 29.6 PG — SIGNIFICANT CHANGE UP (ref 27–34)
MCHC RBC-ENTMCNC: 33.3 GM/DL — SIGNIFICANT CHANGE UP (ref 32–36)
MCHC RBC-ENTMCNC: 33.5 GM/DL — SIGNIFICANT CHANGE UP (ref 32–36)
MCV RBC AUTO: 88.6 FL — SIGNIFICANT CHANGE UP (ref 80–100)
MCV RBC AUTO: 88.7 FL — SIGNIFICANT CHANGE UP (ref 80–100)
MONOCYTES # BLD AUTO: 0.55 K/UL — SIGNIFICANT CHANGE UP (ref 0–0.9)
MONOCYTES # BLD AUTO: 0.61 K/UL — SIGNIFICANT CHANGE UP (ref 0–0.9)
MONOCYTES NFR BLD AUTO: 5.2 % — SIGNIFICANT CHANGE UP (ref 2–14)
MONOCYTES NFR BLD AUTO: 6.1 % — SIGNIFICANT CHANGE UP (ref 2–14)
NEUTROPHILS # BLD AUTO: 6.72 K/UL — SIGNIFICANT CHANGE UP (ref 1.8–7.4)
NEUTROPHILS # BLD AUTO: 9.16 K/UL — HIGH (ref 1.8–7.4)
NEUTROPHILS NFR BLD AUTO: 67.4 % — SIGNIFICANT CHANGE UP (ref 43–77)
NEUTROPHILS NFR BLD AUTO: 86.4 % — HIGH (ref 43–77)
NRBC # BLD: 0 /100 WBCS — SIGNIFICANT CHANGE UP
NRBC # BLD: 0 /100 WBCS — SIGNIFICANT CHANGE UP
NRBC # FLD: 0 K/UL — SIGNIFICANT CHANGE UP
NRBC # FLD: 0 K/UL — SIGNIFICANT CHANGE UP
PHOSPHATE SERPL-MCNC: 3.5 MG/DL — SIGNIFICANT CHANGE UP (ref 2.5–4.5)
PLATELET # BLD AUTO: 118 K/UL — LOW (ref 150–400)
PLATELET # BLD AUTO: 121 K/UL — LOW (ref 150–400)
POTASSIUM SERPL-MCNC: 4.3 MMOL/L — SIGNIFICANT CHANGE UP (ref 3.5–5.3)
POTASSIUM SERPL-SCNC: 4.3 MMOL/L — SIGNIFICANT CHANGE UP (ref 3.5–5.3)
PROTHROM AB SERPL-ACNC: 13.4 SEC — SIGNIFICANT CHANGE UP (ref 10.6–13.6)
RBC # BLD: 2.74 M/UL — LOW (ref 3.8–5.2)
RBC # BLD: 2.8 M/UL — LOW (ref 3.8–5.2)
RBC # FLD: 16 % — HIGH (ref 10.3–14.5)
RBC # FLD: 16.2 % — HIGH (ref 10.3–14.5)
RH IG SCN BLD-IMP: NEGATIVE — SIGNIFICANT CHANGE UP
SARS-COV-2 RNA SPEC QL NAA+PROBE: SIGNIFICANT CHANGE UP
SODIUM SERPL-SCNC: 141 MMOL/L — SIGNIFICANT CHANGE UP (ref 135–145)
WBC # BLD: 10.59 K/UL — HIGH (ref 3.8–10.5)
WBC # BLD: 9.97 K/UL — SIGNIFICANT CHANGE UP (ref 3.8–10.5)
WBC # FLD AUTO: 10.59 K/UL — HIGH (ref 3.8–10.5)
WBC # FLD AUTO: 9.97 K/UL — SIGNIFICANT CHANGE UP (ref 3.8–10.5)

## 2021-05-26 PROCEDURE — 58150 TOTAL HYSTERECTOMY: CPT

## 2021-05-26 PROCEDURE — 88307 TISSUE EXAM BY PATHOLOGIST: CPT | Mod: 26

## 2021-05-26 PROCEDURE — 58150 TOTAL HYSTERECTOMY: CPT | Mod: 82

## 2021-05-26 PROCEDURE — 88331 PATH CONSLTJ SURG 1 BLK 1SPC: CPT | Mod: 26

## 2021-05-26 RX ORDER — ONDANSETRON 8 MG/1
4 TABLET, FILM COATED ORAL ONCE
Refills: 0 | Status: DISCONTINUED | OUTPATIENT
Start: 2021-05-26 | End: 2021-05-26

## 2021-05-26 RX ORDER — ACETAMINOPHEN 500 MG
1000 TABLET ORAL ONCE
Refills: 0 | Status: COMPLETED | OUTPATIENT
Start: 2021-05-26 | End: 2021-05-26

## 2021-05-26 RX ORDER — ACETAMINOPHEN 500 MG
1000 TABLET ORAL ONCE
Refills: 0 | Status: COMPLETED | OUTPATIENT
Start: 2021-05-27 | End: 2021-05-26

## 2021-05-26 RX ORDER — CHLORHEXIDINE GLUCONATE 213 G/1000ML
1 SOLUTION TOPICAL EVERY 12 HOURS
Refills: 0 | Status: COMPLETED | OUTPATIENT
Start: 2021-05-26 | End: 2021-05-26

## 2021-05-26 RX ORDER — FENTANYL CITRATE 50 UG/ML
25 INJECTION INTRAVENOUS
Refills: 0 | Status: DISCONTINUED | OUTPATIENT
Start: 2021-05-26 | End: 2021-05-26

## 2021-05-26 RX ORDER — KETOROLAC TROMETHAMINE 30 MG/ML
15 SYRINGE (ML) INJECTION EVERY 8 HOURS
Refills: 0 | Status: DISCONTINUED | OUTPATIENT
Start: 2021-05-26 | End: 2021-05-27

## 2021-05-26 RX ORDER — ENOXAPARIN SODIUM 100 MG/ML
40 INJECTION SUBCUTANEOUS DAILY
Refills: 0 | Status: DISCONTINUED | OUTPATIENT
Start: 2021-05-26 | End: 2021-05-27

## 2021-05-26 RX ORDER — HYDROMORPHONE HYDROCHLORIDE 2 MG/ML
0.5 INJECTION INTRAMUSCULAR; INTRAVENOUS; SUBCUTANEOUS
Refills: 0 | Status: DISCONTINUED | OUTPATIENT
Start: 2021-05-26 | End: 2021-05-26

## 2021-05-26 RX ORDER — ACETAMINOPHEN 500 MG
1000 TABLET ORAL ONCE
Refills: 0 | Status: COMPLETED | OUTPATIENT
Start: 2021-05-27 | End: 2021-05-27

## 2021-05-26 RX ADMIN — HYDROMORPHONE HYDROCHLORIDE 0.5 MILLIGRAM(S): 2 INJECTION INTRAMUSCULAR; INTRAVENOUS; SUBCUTANEOUS at 12:38

## 2021-05-26 RX ADMIN — Medication 1000 MILLIGRAM(S): at 12:45

## 2021-05-26 RX ADMIN — ENOXAPARIN SODIUM 40 MILLIGRAM(S): 100 INJECTION SUBCUTANEOUS at 18:14

## 2021-05-26 RX ADMIN — HYDROMORPHONE HYDROCHLORIDE 0.5 MILLIGRAM(S): 2 INJECTION INTRAMUSCULAR; INTRAVENOUS; SUBCUTANEOUS at 11:39

## 2021-05-26 RX ADMIN — SODIUM CHLORIDE 125 MILLILITER(S): 9 INJECTION, SOLUTION INTRAVENOUS at 00:10

## 2021-05-26 RX ADMIN — CHLORHEXIDINE GLUCONATE 1 APPLICATION(S): 213 SOLUTION TOPICAL at 06:01

## 2021-05-26 RX ADMIN — Medication 400 MILLIGRAM(S): at 23:54

## 2021-05-26 RX ADMIN — SODIUM CHLORIDE 125 MILLILITER(S): 9 INJECTION, SOLUTION INTRAVENOUS at 12:40

## 2021-05-26 RX ADMIN — Medication 400 MILLIGRAM(S): at 12:16

## 2021-05-26 RX ADMIN — Medication 400 MILLIGRAM(S): at 18:15

## 2021-05-26 RX ADMIN — Medication 15 MILLIGRAM(S): at 18:32

## 2021-05-26 NOTE — BRIEF OPERATIVE NOTE - NSICDXBRIEFPREOP_GEN_ALL_CORE_FT
PRE-OP DIAGNOSIS:  Endometrial hyperplasia 26-May-2021 13:15:26  OscarYin Greenwood  Abnormal uterine bleeding 26-May-2021 13:15:41  OscarYin Greenwood

## 2021-05-26 NOTE — PROGRESS NOTE ADULT - ASSESSMENT
61yo female s/p Total abdominal hysterectomy,  bilateral salpingoophorectomy in stable condition  -LR@125  -clear liquid diet  -Lovenox 40mg   -IV Tylenol, Toradol, po Oxy prn  -chavarria catheter  -encourage incentive spirometer  -am CBC, BMP-replete lytes prn   -dispo: admit to 4T for routine post op car  d/w gyn/onc team    Sara PAC  #[id number]

## 2021-05-26 NOTE — BRIEF OPERATIVE NOTE - NSICDXBRIEFPOSTOP_GEN_ALL_CORE_FT
POST-OP DIAGNOSIS:  Endometrial hyperplasia 26-May-2021 13:15:55  OscarYin Greenwood  Abnormal uterine bleeding 26-May-2021 13:16:05  OscarYin Greenwood

## 2021-05-26 NOTE — PROGRESS NOTE ADULT - SUBJECTIVE AND OBJECTIVE BOX
GYN ONC PROGRESS NOTE    Pt seen and examined at bedside. Overnight, pt made NPO/LR@125 for procedure this AM.  No acute complaints. Pain well controlled on current regimen. +OOB yesterday. +Voiding free;y.  Denies SOB/CP/palpitations, fever/chills, nausea/emesis.     Vital Signs Last 24 Hrs  T(C): 36.7 (26 May 2021 01:52), Max: 37.3 (25 May 2021 17:52)  T(F): 98 (26 May 2021 01:52), Max: 99.1 (25 May 2021 17:52)  HR: 98 (26 May 2021 01:52) (90 - 99)  BP: 119/70 (26 May 2021 01:52) (102/63 - 119/70)  RR: 18 (26 May 2021 01:52) (18 - 18)  SpO2: 100% (26 May 2021 01:52) (99% - 100%)    05-24 @ 07:01  -  05-25 @ 07:00  --------------------------------------------------------  IN: 1020 mL / OUT: 2100 mL / NET: -1080 mL    05-25 @ 07:01  -  05-26 @ 04:59  --------------------------------------------------------  IN: 1270 mL / OUT: 750 mL / NET: 520 mL        PHYSICAL EXAM:  Gen: NAD, A+O x 3  CV: Normal S1/S2, RRR  Pulm: CTA BL  Abd: Soft, appropriately tender, non distended, no rebound or guarding, +BS  : saturated perineal pad x2  Extremities: No swelling or calf tenderness      Labs, additional tests:             8.3    9.97  )-----------( 121      ( 05-26 @ 04:06 )             24.8                8.2    8.34  )-----------( 110      ( 05-25 @ 17:54 )             25.4                8.7    9.11  )-----------( 116      ( 05-25 @ 06:47 )             26.5                9.5    8.75  )-----------( 112      ( 05-24 @ 15:16 )             28.7                8.1    8.62  )-----------( 125      ( 05-24 @ 05:54 )             25.5                9.9    9.87  )-----------( 150      ( 05-23 @ 16:04 )             29.9                7.9    10.24 )-----------( 144      ( 05-23 @ 07:15 )             24.0       05-26    141  |  108<H>  |  12  ----------------------------<  106<H>  4.3   |  20<L>  |  0.74    Ca    8.2<L>      26 May 2021 04:06  Phos  3.5     05-26  Mg     1.9     05-26        MEDICATIONS  (STANDING):  lactated ringers. 1000 milliLiter(s) (125 mL/Hr) IV Continuous <Continuous>  pantoprazole    Tablet 40 milliGRAM(s) Oral before breakfast    MEDICATIONS  (PRN):  acetaminophen   Tablet .. 650 milliGRAM(s) Oral every 6 hours PRN Mild Pain (1 - 3), Moderate Pain (4 - 6)  oxyCODONE    IR 5 milliGRAM(s) Oral every 6 hours PRN Severe Pain (7 - 10)

## 2021-05-26 NOTE — BRIEF OPERATIVE NOTE - OPERATION/FINDINGS
bulky uterus with grossly normal tubes and ovaries bilaterally. normal abdominopelvic survey. no ascites.

## 2021-05-26 NOTE — BRIEF OPERATIVE NOTE - NSICDXBRIEFPROCEDURE_GEN_ALL_CORE_FT
PROCEDURES:  Hysterectomy, total, abdominal, with bilateral salpingoophorectomy 26-May-2021 13:16:52  OscarYin Greenwood

## 2021-05-26 NOTE — PROGRESS NOTE ADULT - ASSESSMENT
61yo HD#5 with simple hyperplasia with atypia admitted w/symptomatic anemia 2/2 vaginal bleeding, s/p total 5u PRBC. Pt hemodynamically stable at this time.     Neuro: Tylenol, Oxy PRN for pain  CV: Hemodynamically stable, Hgb 6.6->3u->9.1->8.9->7.9->1u->9.9-.8.1->1u->9.5->8.7->8.2->8.3; monitor VS; 2 u on hold for OR  Pulm: O2 sat WNL on RA  FEN: LR@125, replete lytes PRN, NPO  GI: Protonix  : Voiding freely, strict I&O, monitor pad counts; Megace 40mg BID for vaginal bleeding  Heme: LE Doppler (5/24): b/l LE below the knee DVT; per IR, no indication for IVC filter   ID: Afebrile, No signs of infection  Endo: no active issues  Dispo: OR today for ANNETTE HOGAN BSO    Brandy Altamirano R2

## 2021-05-26 NOTE — PROGRESS NOTE ADULT - SUBJECTIVE AND OBJECTIVE BOX
GYNECOLOGIC ONCOLOGY PA POST OP NOTE    Pt seen and examined at bedside. Pain well controlled. Patient denies headache, dizziness, nausea, vomiting, chest pain and sob. Pt tolerating water, ice chips. Salazar in place.       OBJECTIVE:     VITALS:  T(F): 98.2 (05-26-21 @ 16:19), Max: 99.1 (05-26-21 @ 05:40)  HR: 93 (05-26-21 @ 16:19) (83 - 98)  BP: 116/55 (05-26-21 @ 16:19) (102/60 - 124/56)  RR: 16 (05-26-21 @ 16:19) (12 - 18)  SpO2: 99% (05-26-21 @ 16:19) (94% - 100%)  Wt(kg): --    I&O's Summary    25 May 2021 07:01  -  26 May 2021 07:00  --------------------------------------------------------  IN: 1270 mL / OUT: 750 mL / NET: 520 mL    26 May 2021 07:01  -  26 May 2021 17:56  --------------------------------------------------------  IN: 850 mL / OUT: 765 mL / NET: 85 mL        MEDICATIONS  (STANDING):  acetaminophen  IVPB .. 1000 milliGRAM(s) IV Intermittent once  chlorhexidine 2% Cloths 1 Application(s) Topical every 12 hours  enoxaparin Injectable 40 milliGRAM(s) SubCutaneous daily  lactated ringers. 1000 milliLiter(s) (125 mL/Hr) IV Continuous <Continuous>  pantoprazole    Tablet 40 milliGRAM(s) Oral before breakfast    MEDICATIONS  (PRN):  oxyCODONE    IR 5 milliGRAM(s) Oral every 6 hours PRN Severe Pain (7 - 10)      Physical Exam:  Constitutional: NAD  Pulmonary: clear to auscultation bilaterally   Cardiovascular: Regular rate and rhythm   Abdomen: soft, non-distended, appropriate tenderness, incision w/dermabond prineo dressing C/D/I, binder in place  Extremities: no lower extremity edema or calve tenderness      LABS:                        8.1    10.59 )-----------( 118      ( 26 May 2021 17:13 )             24.3     05-26    141  |  108<H>  |  12  ----------------------------<  106<H>  4.3   |  20<L>  |  0.74    Ca    8.2<L>      26 May 2021 04:06  Phos  3.5     05-26  Mg     1.9     05-26      PT/INR - ( 26 May 2021 04:06 )   PT: 13.4 sec;   INR: 1.17 ratio         PTT - ( 26 May 2021 04:06 )  PTT:27.9 sec      RADIOLOGY & ADDITIONAL TESTS:

## 2021-05-27 DIAGNOSIS — Z98.890 OTHER SPECIFIED POSTPROCEDURAL STATES: ICD-10-CM

## 2021-05-27 LAB
ANION GAP SERPL CALC-SCNC: 10 MMOL/L — SIGNIFICANT CHANGE UP (ref 7–14)
BASOPHILS # BLD AUTO: 0.03 K/UL — SIGNIFICANT CHANGE UP (ref 0–0.2)
BASOPHILS # BLD AUTO: 0.06 K/UL — SIGNIFICANT CHANGE UP (ref 0–0.2)
BASOPHILS NFR BLD AUTO: 0.3 % — SIGNIFICANT CHANGE UP (ref 0–2)
BASOPHILS NFR BLD AUTO: 0.7 % — SIGNIFICANT CHANGE UP (ref 0–2)
BUN SERPL-MCNC: 5 MG/DL — LOW (ref 7–23)
CALCIUM SERPL-MCNC: 7.9 MG/DL — LOW (ref 8.4–10.5)
CHLORIDE SERPL-SCNC: 105 MMOL/L — SIGNIFICANT CHANGE UP (ref 98–107)
CO2 SERPL-SCNC: 23 MMOL/L — SIGNIFICANT CHANGE UP (ref 22–31)
CREAT SERPL-MCNC: 0.7 MG/DL — SIGNIFICANT CHANGE UP (ref 0.5–1.3)
EOSINOPHIL # BLD AUTO: 0.21 K/UL — SIGNIFICANT CHANGE UP (ref 0–0.5)
EOSINOPHIL # BLD AUTO: 0.29 K/UL — SIGNIFICANT CHANGE UP (ref 0–0.5)
EOSINOPHIL NFR BLD AUTO: 2.4 % — SIGNIFICANT CHANGE UP (ref 0–6)
EOSINOPHIL NFR BLD AUTO: 3.2 % — SIGNIFICANT CHANGE UP (ref 0–6)
GLUCOSE SERPL-MCNC: 96 MG/DL — SIGNIFICANT CHANGE UP (ref 70–99)
HCT VFR BLD CALC: 23 % — LOW (ref 34.5–45)
HCT VFR BLD CALC: 24.1 % — LOW (ref 34.5–45)
HGB BLD-MCNC: 7.6 G/DL — LOW (ref 11.5–15.5)
HGB BLD-MCNC: 7.6 G/DL — LOW (ref 11.5–15.5)
IANC: 6.18 K/UL — SIGNIFICANT CHANGE UP (ref 1.5–8.5)
IANC: 6.46 K/UL — SIGNIFICANT CHANGE UP (ref 1.5–8.5)
IMM GRANULOCYTES NFR BLD AUTO: 0.6 % — SIGNIFICANT CHANGE UP (ref 0–1.5)
IMM GRANULOCYTES NFR BLD AUTO: 0.6 % — SIGNIFICANT CHANGE UP (ref 0–1.5)
LYMPHOCYTES # BLD AUTO: 1.51 K/UL — SIGNIFICANT CHANGE UP (ref 1–3.3)
LYMPHOCYTES # BLD AUTO: 1.64 K/UL — SIGNIFICANT CHANGE UP (ref 1–3.3)
LYMPHOCYTES # BLD AUTO: 17.3 % — SIGNIFICANT CHANGE UP (ref 13–44)
LYMPHOCYTES # BLD AUTO: 18.3 % — SIGNIFICANT CHANGE UP (ref 13–44)
MAGNESIUM SERPL-MCNC: 1.9 MG/DL — SIGNIFICANT CHANGE UP (ref 1.6–2.6)
MCHC RBC-ENTMCNC: 29 PG — SIGNIFICANT CHANGE UP (ref 27–34)
MCHC RBC-ENTMCNC: 29.6 PG — SIGNIFICANT CHANGE UP (ref 27–34)
MCHC RBC-ENTMCNC: 31.5 GM/DL — LOW (ref 32–36)
MCHC RBC-ENTMCNC: 33 GM/DL — SIGNIFICANT CHANGE UP (ref 32–36)
MCV RBC AUTO: 89.5 FL — SIGNIFICANT CHANGE UP (ref 80–100)
MCV RBC AUTO: 92 FL — SIGNIFICANT CHANGE UP (ref 80–100)
MONOCYTES # BLD AUTO: 0.51 K/UL — SIGNIFICANT CHANGE UP (ref 0–0.9)
MONOCYTES # BLD AUTO: 0.7 K/UL — SIGNIFICANT CHANGE UP (ref 0–0.9)
MONOCYTES NFR BLD AUTO: 5.7 % — SIGNIFICANT CHANGE UP (ref 2–14)
MONOCYTES NFR BLD AUTO: 8 % — SIGNIFICANT CHANGE UP (ref 2–14)
NEUTROPHILS # BLD AUTO: 6.18 K/UL — SIGNIFICANT CHANGE UP (ref 1.8–7.4)
NEUTROPHILS # BLD AUTO: 6.46 K/UL — SIGNIFICANT CHANGE UP (ref 1.8–7.4)
NEUTROPHILS NFR BLD AUTO: 71 % — SIGNIFICANT CHANGE UP (ref 43–77)
NEUTROPHILS NFR BLD AUTO: 71.9 % — SIGNIFICANT CHANGE UP (ref 43–77)
NRBC # BLD: 0 /100 WBCS — SIGNIFICANT CHANGE UP
NRBC # BLD: 0 /100 WBCS — SIGNIFICANT CHANGE UP
NRBC # FLD: 0.02 K/UL — HIGH
NRBC # FLD: 0.02 K/UL — HIGH
PHOSPHATE SERPL-MCNC: 2.8 MG/DL — SIGNIFICANT CHANGE UP (ref 2.5–4.5)
PLATELET # BLD AUTO: 153 K/UL — SIGNIFICANT CHANGE UP (ref 150–400)
PLATELET # BLD AUTO: 191 K/UL — SIGNIFICANT CHANGE UP (ref 150–400)
POTASSIUM SERPL-MCNC: 3.7 MMOL/L — SIGNIFICANT CHANGE UP (ref 3.5–5.3)
POTASSIUM SERPL-SCNC: 3.7 MMOL/L — SIGNIFICANT CHANGE UP (ref 3.5–5.3)
RBC # BLD: 2.57 M/UL — LOW (ref 3.8–5.2)
RBC # BLD: 2.62 M/UL — LOW (ref 3.8–5.2)
RBC # FLD: 15.7 % — HIGH (ref 10.3–14.5)
RBC # FLD: 15.9 % — HIGH (ref 10.3–14.5)
SODIUM SERPL-SCNC: 138 MMOL/L — SIGNIFICANT CHANGE UP (ref 135–145)
WBC # BLD: 8.71 K/UL — SIGNIFICANT CHANGE UP (ref 3.8–10.5)
WBC # BLD: 8.98 K/UL — SIGNIFICANT CHANGE UP (ref 3.8–10.5)
WBC # FLD AUTO: 8.71 K/UL — SIGNIFICANT CHANGE UP (ref 3.8–10.5)
WBC # FLD AUTO: 8.98 K/UL — SIGNIFICANT CHANGE UP (ref 3.8–10.5)

## 2021-05-27 RX ORDER — ACETAMINOPHEN 500 MG
975 TABLET ORAL EVERY 6 HOURS
Refills: 0 | Status: DISCONTINUED | OUTPATIENT
Start: 2021-05-27 | End: 2021-05-29

## 2021-05-27 RX ORDER — ENOXAPARIN SODIUM 100 MG/ML
80 INJECTION SUBCUTANEOUS
Refills: 0 | Status: DISCONTINUED | OUTPATIENT
Start: 2021-05-27 | End: 2021-05-28

## 2021-05-27 RX ORDER — IBUPROFEN 200 MG
600 TABLET ORAL EVERY 6 HOURS
Refills: 0 | Status: DISCONTINUED | OUTPATIENT
Start: 2021-05-27 | End: 2021-05-29

## 2021-05-27 RX ORDER — ENOXAPARIN SODIUM 100 MG/ML
40 INJECTION SUBCUTANEOUS ONCE
Refills: 0 | Status: COMPLETED | OUTPATIENT
Start: 2021-05-27 | End: 2021-05-27

## 2021-05-27 RX ORDER — SODIUM CHLORIDE 9 MG/ML
3 INJECTION INTRAMUSCULAR; INTRAVENOUS; SUBCUTANEOUS EVERY 8 HOURS
Refills: 0 | Status: DISCONTINUED | OUTPATIENT
Start: 2021-05-27 | End: 2021-05-29

## 2021-05-27 RX ADMIN — ENOXAPARIN SODIUM 40 MILLIGRAM(S): 100 INJECTION SUBCUTANEOUS at 07:55

## 2021-05-27 RX ADMIN — ENOXAPARIN SODIUM 80 MILLIGRAM(S): 100 INJECTION SUBCUTANEOUS at 20:41

## 2021-05-27 RX ADMIN — OXYCODONE HYDROCHLORIDE 5 MILLIGRAM(S): 5 TABLET ORAL at 09:39

## 2021-05-27 RX ADMIN — Medication 975 MILLIGRAM(S): at 11:58

## 2021-05-27 RX ADMIN — Medication 15 MILLIGRAM(S): at 06:24

## 2021-05-27 RX ADMIN — Medication 975 MILLIGRAM(S): at 23:11

## 2021-05-27 RX ADMIN — Medication 400 MILLIGRAM(S): at 06:24

## 2021-05-27 RX ADMIN — Medication 15 MILLIGRAM(S): at 14:27

## 2021-05-27 RX ADMIN — SODIUM CHLORIDE 3 MILLILITER(S): 9 INJECTION INTRAMUSCULAR; INTRAVENOUS; SUBCUTANEOUS at 22:50

## 2021-05-27 RX ADMIN — PANTOPRAZOLE SODIUM 40 MILLIGRAM(S): 20 TABLET, DELAYED RELEASE ORAL at 06:24

## 2021-05-27 RX ADMIN — Medication 600 MILLIGRAM(S): at 20:39

## 2021-05-27 RX ADMIN — SODIUM CHLORIDE 3 MILLILITER(S): 9 INJECTION INTRAMUSCULAR; INTRAVENOUS; SUBCUTANEOUS at 14:08

## 2021-05-27 RX ADMIN — Medication 975 MILLIGRAM(S): at 18:43

## 2021-05-27 RX ADMIN — OXYCODONE HYDROCHLORIDE 5 MILLIGRAM(S): 5 TABLET ORAL at 10:10

## 2021-05-27 NOTE — PROGRESS NOTE ADULT - PROBLEM SELECTOR PLAN 2
Fellow Note    Patient seen and examined. Agree with above.    VS reviewed  Labs reviewed Hgb 7.6    Reg diet  OOB  Plan to start therapeutic Lovenox tonight  PO analgesia when tolerating  dc deon Roach MD

## 2021-05-27 NOTE — PROGRESS NOTE ADULT - SUBJECTIVE AND OBJECTIVE BOX
ANESTHESIA POSTOP CHECK    60y Female POSTOP DAY 1      Vital Signs Last 24 Hrs  T(C): 37.2 (27 May 2021 06:19), Max: 37.4 (27 May 2021 00:56)  T(F): 98.9 (27 May 2021 06:19), Max: 99.3 (27 May 2021 00:56)  HR: 89 (27 May 2021 06:19) (83 - 94)  BP: 130/58 (27 May 2021 06:19) (102/60 - 130/58)  BP(mean): 72 (26 May 2021 15:00) (69 - 74)  RR: 18 (27 May 2021 06:19) (12 - 18)  SpO2: 98% (27 May 2021 06:19) (94% - 100%)      [x ] NO APPARENT ANESTHESIA COMPLICATIONS      Comments:

## 2021-05-27 NOTE — PROGRESS NOTE ADULT - SUBJECTIVE AND OBJECTIVE BOX
Gyn ONC Progress Note     POD #1    Subjective:   Pt seen and examined at bedside. No events overnight. Pain well controlled. Vaginal bleeding has improved; patient only required _______ pads overnight. Patient ambulating. Passing flatus. Tolerated clear liquid diet overnight. Pt denies fever, chills, chest pain, SOB, nausea, vomiting, lightheadedness, dizziness.    Chavarria in place draining clear yellow urine.     Objective:  T(F): 99.3 (05-27-21 @ 00:56), Max: 99.3 (05-27-21 @ 00:56)  HR: 94 (05-27-21 @ 00:56) (83 - 94)  BP: 115/62 (05-27-21 @ 00:56) (102/60 - 124/56)  RR: 16 (05-27-21 @ 00:56) (12 - 18)  SpO2: 96% (05-27-21 @ 00:56) (94% - 100%)  Wt(kg): --  I&O's Summary    25 May 2021 07:01  -  26 May 2021 07:00  --------------------------------------------------------  IN: 1270 mL / OUT: 750 mL / NET: 520 mL    26 May 2021 07:01  -  27 May 2021 04:59  --------------------------------------------------------  IN: 1700 mL / OUT: 2465 mL / NET: -765 mL      CAPILLARY BLOOD GLUCOSE      POCT Blood Glucose.: 103 mg/dL (26 May 2021 06:02)      MEDICATIONS  (STANDING):  acetaminophen  IVPB .. 1000 milliGRAM(s) IV Intermittent once  enoxaparin Injectable 40 milliGRAM(s) SubCutaneous daily  ketorolac   Injectable 15 milliGRAM(s) IV Push every 8 hours  lactated ringers. 1000 milliLiter(s) (125 mL/Hr) IV Continuous <Continuous>  pantoprazole    Tablet 40 milliGRAM(s) Oral before breakfast    MEDICATIONS  (PRN):  oxyCODONE    IR 5 milliGRAM(s) Oral every 6 hours PRN Severe Pain (7 - 10)      Physical Exam:  Constitutional: NAD, A+O x3  CV: RRR  Lungs: clear to auscultation bilaterally  Abdomen: soft, nondistended, no guarding, no rebound, +bowel sounds  Incision: midline vertical incision clean, dry, intact w/ dermabond prineo   Extremities: no lower extremity edema or calf tenderness bilaterally; venodynes in place    LABS:  05-26    141    |  108<H>  |  12     ----------------------------<  106<H>  4.3     |  20<L>  |  0.74     Ca    8.2<L>      26 May 2021 04:06  Phos  3.5       05-26  Mg     1.9       05-26          PT/INR - ( 26 May 2021 04:06 )   PT: 13.4 sec;   INR: 1.17 ratio         PTT - ( 26 May 2021 04:06 )  PTT:27.9 sec      Assessment/Plan: 60y female HD#7 admitted with vaginal bleeding and symptomatic anemia s/p total 5u PRBC transfusion, POD#1, s/p ex-lap, EDISON, BSO for persistent vaginal bleeding and anemia.     CV: Hemodynamically stable, f/u AM CBC.  Pulm: Saturating well on RA. Increase incentive spirometry.  GI: Advance diet as tolerated.  : Chavarria in place. Adequate UOP. d/c chavarria if AM labs stable. Strict pad counts.   Heme: Increase Lovenox to______ and continue Venodynes for DVT ppx. Increase OOB.    Neuro: IV Tylenol, Toradol and Oxycodone for pain control. Transition to PO analgesics today if tolerating regular diet.     Eveline Lopez, PGY1 Gyn ONC Progress Note     POD #1    Subjective:   Pt seen and examined at bedside. No events overnight. Pain well controlled. Vaginal bleeding has improved; patient only required _______ pads overnight. Patient ambulating. Passing flatus. Tolerated clear liquid diet overnight. Pt denies fever, chills, chest pain, SOB, nausea, vomiting, lightheadedness, dizziness.    Salazar in place draining clear yellow urine.     Objective:  T(F): 99.3 (05-27-21 @ 00:56), Max: 99.3 (05-27-21 @ 00:56)  HR: 94 (05-27-21 @ 00:56) (83 - 94)  BP: 115/62 (05-27-21 @ 00:56) (102/60 - 124/56)  RR: 16 (05-27-21 @ 00:56) (12 - 18)  SpO2: 96% (05-27-21 @ 00:56) (94% - 100%)  I&O's Summary    25 May 2021 07:01  -  26 May 2021 07:00  --------------------------------------------------------  IN: 1270 mL / OUT: 750 mL / NET: 520 mL    26 May 2021 07:01  -  27 May 2021 04:59  --------------------------------------------------------  IN: 1700 mL / OUT: 2465 mL / NET: -765 mL    CAPILLARY BLOOD GLUCOSE  POCT Blood Glucose.: 103 mg/dL (26 May 2021 06:02)      MEDICATIONS  (STANDING):  acetaminophen  IVPB .. 1000 milliGRAM(s) IV Intermittent once  enoxaparin Injectable 40 milliGRAM(s) SubCutaneous daily  ketorolac   Injectable 15 milliGRAM(s) IV Push every 8 hours  lactated ringers. 1000 milliLiter(s) (125 mL/Hr) IV Continuous <Continuous>  pantoprazole    Tablet 40 milliGRAM(s) Oral before breakfast    MEDICATIONS  (PRN):  oxyCODONE    IR 5 milliGRAM(s) Oral every 6 hours PRN Severe Pain (7 - 10)    Physical Exam:  Constitutional: NAD, A+O x3  CV: RRR  Lungs: clear to auscultation bilaterally  Abdomen: soft, nondistended, no guarding, no rebound, +bowel sounds  Incision: midline vertical incision clean, dry, intact w/ dermabond prineo   Extremities: no lower extremity edema or calf tenderness bilaterally; venodynes in place    LABS:  05-26    141    |  108<H>  |  12     ----------------------------<  106<H>  4.3     |  20<L>  |  0.74     Ca    8.2<L>      26 May 2021 04:06  Phos  3.5       05-26  Mg     1.9       05-26    PT/INR - ( 26 May 2021 04:06 )   PT: 13.4 sec;   INR: 1.17 ratio       PTT - ( 26 May 2021 04:06 )  PTT:27.9 sec Gyn ONC Progress Note     POD #1    Subjective:   Pt seen and examined at bedside. No events overnight. Pain well controlled. Patient denies any vaginal bleeding postoperatively. She is ambulating. Passing flatus. Tolerated clear liquid diet overnight. Pt denies fever, chills, chest pain, SOB, nausea, vomiting, lightheadedness, dizziness.    Salazar in place draining clear yellow urine.     Objective:  T(F): 99.3 (05-27-21 @ 00:56), Max: 99.3 (05-27-21 @ 00:56)  HR: 94 (05-27-21 @ 00:56) (83 - 94)  BP: 115/62 (05-27-21 @ 00:56) (102/60 - 124/56)  RR: 16 (05-27-21 @ 00:56) (12 - 18)  SpO2: 96% (05-27-21 @ 00:56) (94% - 100%)  I&O's Summary    25 May 2021 07:01  -  26 May 2021 07:00  --------------------------------------------------------  IN: 1270 mL / OUT: 750 mL / NET: 520 mL    26 May 2021 07:01  -  27 May 2021 04:59  --------------------------------------------------------  IN: 1700 mL / OUT: 2465 mL / NET: -765 mL    CAPILLARY BLOOD GLUCOSE  POCT Blood Glucose.: 103 mg/dL (26 May 2021 06:02)      MEDICATIONS  (STANDING):  acetaminophen  IVPB .. 1000 milliGRAM(s) IV Intermittent once  enoxaparin Injectable 40 milliGRAM(s) SubCutaneous daily  ketorolac   Injectable 15 milliGRAM(s) IV Push every 8 hours  lactated ringers. 1000 milliLiter(s) (125 mL/Hr) IV Continuous <Continuous>  pantoprazole    Tablet 40 milliGRAM(s) Oral before breakfast    MEDICATIONS  (PRN):  oxyCODONE    IR 5 milliGRAM(s) Oral every 6 hours PRN Severe Pain (7 - 10)    Physical Exam:  Constitutional: NAD, A+O x3  CV: RRR  Lungs: clear to auscultation bilaterally  Abdomen: soft, nondistended, no guarding, no rebound, +bowel sounds  Incision: pfannenstiel incision clean, dry, intact w/ dermabond prineo   Extremities: no lower extremity edema or calf tenderness bilaterally; venodynes in place    LABS:  05-26    141    |  108<H>  |  12     ----------------------------<  106<H>  4.3     |  20<L>  |  0.74     Ca    8.2<L>      26 May 2021 04:06  Phos  3.5       05-26  Mg     1.9       05-26    PT/INR - ( 26 May 2021 04:06 )   PT: 13.4 sec;   INR: 1.17 ratio       PTT - ( 26 May 2021 04:06 )  PTT:27.9 sec

## 2021-05-27 NOTE — PROGRESS NOTE ADULT - ASSESSMENT
Assessment/Plan: 60y female HD#7 admitted with vaginal bleeding and symptomatic anemia s/p total 5u PRBC transfusion, POD#1, s/p ex-lap, EDISON, BSO for persistent vaginal bleeding and anemia.  Assessment/Plan: 60y female HD#7 admitted with vaginal bleeding and symptomatic anemia s/p total 5u PRBC transfusion, POD#1, s/p ex-lap, EDISON, BSO for persistent vaginal bleeding and anemia. Patient stable and doing well today, and meeting all postoperative milestones.

## 2021-05-27 NOTE — PROGRESS NOTE ADULT - PROBLEM SELECTOR PLAN 1
CV: Hemodynamically stable, f/u AM CBC.  Pulm: Saturating well on RA. Increase incentive spirometry.  GI: Advance diet as tolerated.  : Chavarria in place. Adequate UOP. d/c chavarria if AM labs stable. Strict pad counts.   Heme: Increase Lovenox to______ and continue Venodynes for DVT ppx. Increase OOB.    Neuro: IV Tylenol, Toradol and Oxycodone for pain control. Transition to PO analgesics today if tolerating regular diet.     Eveline Lopez, PGY1 CV: Hemodynamically stable, f/u AM CBC.  Pulm: Saturating well on RA. Increase incentive spirometry.  GI: Advance diet as tolerated.  : LR@125. Chavarria in place. Adequate UOP. f/u AM BMP/Mg/Phos, replete PRN. d/c chavarria if AM labs stable. Continue strict pad counts.   Heme: Increase Lovenox to______ and continue Venodynes for DVT ppx. Increase OOB.    Neuro: IV Tylenol, Toradol and Oxycodone for pain control. Transition to PO analgesics today if tolerating regular diet.     Eveline Lopez, PGY1 CV: Hemodynamically stable, f/u AM CBC.  Pulm: Saturating well on RA. Increase incentive spirometry.  GI: Advance diet as tolerated.  : LR@125. Chavarria in place. Adequate UOP. f/u AM BMP/Mg/Phos, replete PRN. d/c chavarria if AM labs stable. Continue strict pad counts.   Heme: Continue Lovenox/Venodynes for DVT ppx. Evaluate for need for therapeutic Lovenox dosing. Increase OOB.    Neuro: IV Tylenol, Toradol and Oxycodone for pain control. Transition to PO analgesics today if tolerating regular diet.     Eveline Lopez, PGY1

## 2021-05-28 LAB
ANION GAP SERPL CALC-SCNC: 10 MMOL/L — SIGNIFICANT CHANGE UP (ref 7–14)
BASOPHILS # BLD AUTO: 0.06 K/UL — SIGNIFICANT CHANGE UP (ref 0–0.2)
BASOPHILS NFR BLD AUTO: 0.6 % — SIGNIFICANT CHANGE UP (ref 0–2)
BUN SERPL-MCNC: 7 MG/DL — SIGNIFICANT CHANGE UP (ref 7–23)
CALCIUM SERPL-MCNC: 8.1 MG/DL — LOW (ref 8.4–10.5)
CHLORIDE SERPL-SCNC: 107 MMOL/L — SIGNIFICANT CHANGE UP (ref 98–107)
CO2 SERPL-SCNC: 24 MMOL/L — SIGNIFICANT CHANGE UP (ref 22–31)
CREAT SERPL-MCNC: 0.74 MG/DL — SIGNIFICANT CHANGE UP (ref 0.5–1.3)
EOSINOPHIL # BLD AUTO: 0.46 K/UL — SIGNIFICANT CHANGE UP (ref 0–0.5)
EOSINOPHIL NFR BLD AUTO: 4.9 % — SIGNIFICANT CHANGE UP (ref 0–6)
GLUCOSE SERPL-MCNC: 100 MG/DL — HIGH (ref 70–99)
HCT VFR BLD CALC: 23.9 % — LOW (ref 34.5–45)
HGB BLD-MCNC: 7.7 G/DL — LOW (ref 11.5–15.5)
IANC: 6.44 K/UL — SIGNIFICANT CHANGE UP (ref 1.5–8.5)
IMM GRANULOCYTES NFR BLD AUTO: 0.7 % — SIGNIFICANT CHANGE UP (ref 0–1.5)
LYMPHOCYTES # BLD AUTO: 1.67 K/UL — SIGNIFICANT CHANGE UP (ref 1–3.3)
LYMPHOCYTES # BLD AUTO: 17.8 % — SIGNIFICANT CHANGE UP (ref 13–44)
MAGNESIUM SERPL-MCNC: 2 MG/DL — SIGNIFICANT CHANGE UP (ref 1.6–2.6)
MCHC RBC-ENTMCNC: 28.9 PG — SIGNIFICANT CHANGE UP (ref 27–34)
MCHC RBC-ENTMCNC: 32.2 GM/DL — SIGNIFICANT CHANGE UP (ref 32–36)
MCV RBC AUTO: 89.8 FL — SIGNIFICANT CHANGE UP (ref 80–100)
MONOCYTES # BLD AUTO: 0.69 K/UL — SIGNIFICANT CHANGE UP (ref 0–0.9)
MONOCYTES NFR BLD AUTO: 7.3 % — SIGNIFICANT CHANGE UP (ref 2–14)
NEUTROPHILS # BLD AUTO: 6.44 K/UL — SIGNIFICANT CHANGE UP (ref 1.8–7.4)
NEUTROPHILS NFR BLD AUTO: 68.7 % — SIGNIFICANT CHANGE UP (ref 43–77)
NRBC # BLD: 0 /100 WBCS — SIGNIFICANT CHANGE UP
NRBC # FLD: 0 K/UL — SIGNIFICANT CHANGE UP
PHOSPHATE SERPL-MCNC: 3 MG/DL — SIGNIFICANT CHANGE UP (ref 2.5–4.5)
PLATELET # BLD AUTO: 215 K/UL — SIGNIFICANT CHANGE UP (ref 150–400)
POTASSIUM SERPL-MCNC: 3.5 MMOL/L — SIGNIFICANT CHANGE UP (ref 3.5–5.3)
POTASSIUM SERPL-SCNC: 3.5 MMOL/L — SIGNIFICANT CHANGE UP (ref 3.5–5.3)
RBC # BLD: 2.66 M/UL — LOW (ref 3.8–5.2)
RBC # FLD: 15.2 % — HIGH (ref 10.3–14.5)
SODIUM SERPL-SCNC: 141 MMOL/L — SIGNIFICANT CHANGE UP (ref 135–145)
WBC # BLD: 9.39 K/UL — SIGNIFICANT CHANGE UP (ref 3.8–10.5)
WBC # FLD AUTO: 9.39 K/UL — SIGNIFICANT CHANGE UP (ref 3.8–10.5)

## 2021-05-28 RX ORDER — BENZOCAINE AND MENTHOL 5; 1 G/100ML; G/100ML
1 LIQUID ORAL
Refills: 0 | Status: DISCONTINUED | OUTPATIENT
Start: 2021-05-28 | End: 2021-05-29

## 2021-05-28 RX ORDER — FONDAPARINUX SODIUM 2.5 MG/.5ML
1 INJECTION, SOLUTION SUBCUTANEOUS
Qty: 42 | Refills: 0
Start: 2021-05-28 | End: 2021-06-17

## 2021-05-28 RX ORDER — IBUPROFEN 200 MG
1 TABLET ORAL
Qty: 0 | Refills: 0 | DISCHARGE
Start: 2021-05-28

## 2021-05-28 RX ORDER — OXYCODONE HYDROCHLORIDE 5 MG/1
1 TABLET ORAL
Qty: 15 | Refills: 0
Start: 2021-05-28

## 2021-05-28 RX ORDER — PETROLATUM,WHITE
1 JELLY (GRAM) TOPICAL
Refills: 0 | Status: DISCONTINUED | OUTPATIENT
Start: 2021-05-28 | End: 2021-05-29

## 2021-05-28 RX ORDER — POTASSIUM CHLORIDE 20 MEQ
40 PACKET (EA) ORAL EVERY 4 HOURS
Refills: 0 | Status: COMPLETED | OUTPATIENT
Start: 2021-05-28 | End: 2021-05-28

## 2021-05-28 RX ORDER — RIVAROXABAN 15 MG-20MG
1 KIT ORAL
Qty: 75 | Refills: 0
Start: 2021-05-28 | End: 2021-08-10

## 2021-05-28 RX ORDER — RIVAROXABAN 15 MG-20MG
1 KIT ORAL
Qty: 30 | Refills: 0
Start: 2021-05-28 | End: 2021-06-26

## 2021-05-28 RX ORDER — RIVAROXABAN 15 MG-20MG
15 KIT ORAL
Refills: 0 | Status: DISCONTINUED | OUTPATIENT
Start: 2021-05-28 | End: 2021-05-29

## 2021-05-28 RX ADMIN — BENZOCAINE AND MENTHOL 1 LOZENGE: 5; 1 LIQUID ORAL at 17:37

## 2021-05-28 RX ADMIN — Medication 600 MILLIGRAM(S): at 21:03

## 2021-05-28 RX ADMIN — Medication 975 MILLIGRAM(S): at 12:40

## 2021-05-28 RX ADMIN — PANTOPRAZOLE SODIUM 40 MILLIGRAM(S): 20 TABLET, DELAYED RELEASE ORAL at 05:41

## 2021-05-28 RX ADMIN — Medication 975 MILLIGRAM(S): at 05:41

## 2021-05-28 RX ADMIN — Medication 600 MILLIGRAM(S): at 15:40

## 2021-05-28 RX ADMIN — Medication 600 MILLIGRAM(S): at 09:03

## 2021-05-28 RX ADMIN — Medication 40 MILLIEQUIVALENT(S): at 09:03

## 2021-05-28 RX ADMIN — Medication 600 MILLIGRAM(S): at 22:00

## 2021-05-28 RX ADMIN — RIVAROXABAN 15 MILLIGRAM(S): KIT at 21:03

## 2021-05-28 RX ADMIN — Medication 975 MILLIGRAM(S): at 11:50

## 2021-05-28 RX ADMIN — Medication 40 MILLIEQUIVALENT(S): at 13:22

## 2021-05-28 RX ADMIN — SODIUM CHLORIDE 3 MILLILITER(S): 9 INJECTION INTRAMUSCULAR; INTRAVENOUS; SUBCUTANEOUS at 21:04

## 2021-05-28 RX ADMIN — SODIUM CHLORIDE 3 MILLILITER(S): 9 INJECTION INTRAMUSCULAR; INTRAVENOUS; SUBCUTANEOUS at 13:21

## 2021-05-28 RX ADMIN — Medication 600 MILLIGRAM(S): at 04:31

## 2021-05-28 RX ADMIN — SODIUM CHLORIDE 3 MILLILITER(S): 9 INJECTION INTRAMUSCULAR; INTRAVENOUS; SUBCUTANEOUS at 05:38

## 2021-05-28 RX ADMIN — ENOXAPARIN SODIUM 80 MILLIGRAM(S): 100 INJECTION SUBCUTANEOUS at 09:03

## 2021-05-28 NOTE — PROGRESS NOTE ADULT - PROBLEM SELECTOR PLAN 2
Fellow Note    Patient seen and examined. Agree with above.    VS reviewed  Labs reviewed    Reg diet  OOB  Lovenox 80 this AM, transition to Xarelto tonight  PO analgesia  Routine postoperative care    Marley HENRIQUEZ

## 2021-05-28 NOTE — PROGRESS NOTE ADULT - ASSESSMENT
Assessment/Plan: 60y female HD#8 admitted w/ vaginal bleeding and symptomatic anemia, POD#2, s/p ex-lap, EDISON, BSO (frozen=benign). Patient is doing well, vaginal bleeding has resolved, and she is meeting all postoperative milestones.

## 2021-05-28 NOTE — PROGRESS NOTE ADULT - PROBLEM SELECTOR PLAN 1
CV: Hemodynamically stable, f/u AM CBC.  Pulm: Saturating well on RA. Continue incentive spirometry.  GI: Tolerating regular diet.   : Adequate UOP. Continue to monitor. f/u AM BMP/Mg/Phos, replete PRN.   Heme: Continue Lovenox 80 BID and Venodynes for DVT ppx. Increase OOB.    Neuro: Continue oral meds for pain control.    Eveline Lopez, PGY1 CV: Hemodynamically stable, f/u AM CBC.  Pulm: Saturating well on RA. Continue incentive spirometry.  GI: Tolerating regular diet.   : Adequate UOP. f/u AM BMP/Mg/Phos, replete PRN.   Heme: Continue Lovenox 80 BID for DVT ppx. Increase OOB.    Neuro: Continue oral meds for pain control.    Eveline Lopez, PGY1 CV: Hemodynamically stable, f/u AM CBC.  Pulm: Saturating well on RA. Continue incentive spirometry.  GI: Tolerating regular diet.   : Adequate UOP. f/u AM BMP/Mg/Phos, replete PRN.   Heme: Continue Lovenox 80 BID for DVT ppx. Transition to Xarelto tonight. Increase OOB.    Neuro: Continue oral meds for pain control.    Eveline Lopez, PGY1

## 2021-05-28 NOTE — PROGRESS NOTE ADULT - SUBJECTIVE AND OBJECTIVE BOX
Gyn ONC Progress Note     HD#8 POD #2    Subjective:   Pt seen and examined at bedside. No events overnight. Vaginal bleeding has resolved. Pain well controlled. Patient ambulating. Passing flatus. Tolerating regular diet. Pt denies fever, chills, chest pain, SOB, nausea, vomiting, lightheadedness, dizziness.      Objective:  T(F): 98.1 (05-28-21 @ 01:30), Max: 98.9 (05-27-21 @ 06:19)  HR: 89 (05-28-21 @ 01:30) (89 - 100)  BP: 108/61 (05-28-21 @ 01:30) (97/53 - 130/58)  RR: 20 (05-28-21 @ 01:30) (18 - 20)  SpO2: 100% (05-28-21 @ 01:30) (96% - 100%)  Wt(kg): --  I&O's Summary    26 May 2021 07:01  -  27 May 2021 07:00  --------------------------------------------------------  IN: 2820 mL / OUT: 2465 mL / NET: 355 mL    27 May 2021 07:01  -  28 May 2021 05:16  --------------------------------------------------------  IN: 470 mL / OUT: 2650 mL / NET: -2180 mL      CAPILLARY BLOOD GLUCOSE          MEDICATIONS  (STANDING):  acetaminophen   Tablet .. 975 milliGRAM(s) Oral every 6 hours  enoxaparin Injectable 80 milliGRAM(s) SubCutaneous two times a day  ibuprofen  Tablet. 600 milliGRAM(s) Oral every 6 hours  pantoprazole    Tablet 40 milliGRAM(s) Oral before breakfast  sodium chloride 0.9% lock flush 3 milliLiter(s) IV Push every 8 hours    MEDICATIONS  (PRN):  oxyCODONE    IR 5 milliGRAM(s) Oral every 6 hours PRN Severe Pain (7 - 10)      Physical Exam:  Constitutional: NAD, A+O x3  CV: RRR  Lungs: clear to auscultation bilaterally  Abdomen: soft, nondistended, no guarding, no rebound, +bowel sounds; abdominal binder in place  Incision: pfannenstiel incision clean, dry, intact   Extremities: no lower extremity edema or calf tenderness bilaterally; venodynes in place    LABS:  05-27    138    |  105    |  5<L>   ----------------------------<  96     3.7     |  23     |  0.70     Ca    7.9<L>      27 May 2021 06:45  Phos  2.8       05-27  Mg     1.9       05-27 Gyn ONC Progress Note     HD#8 POD #2    Subjective:   Pt seen and examined at bedside. No events overnight. Lovenox dose was increased to 80mg BID last night. Vaginal bleeding has resolved. Pain well controlled. Patient ambulating. Passing flatus. Tolerating regular diet. Pt denies fever, chills, chest pain, SOB, nausea, vomiting, lightheadedness, dizziness.      Objective:  T(F): 98.1 (05-28-21 @ 01:30), Max: 98.9 (05-27-21 @ 06:19)  HR: 89 (05-28-21 @ 01:30) (89 - 100)  BP: 108/61 (05-28-21 @ 01:30) (97/53 - 130/58)  RR: 20 (05-28-21 @ 01:30) (18 - 20)  SpO2: 100% (05-28-21 @ 01:30) (96% - 100%)  Wt(kg): --  I&O's Summary    26 May 2021 07:01  -  27 May 2021 07:00  --------------------------------------------------------  IN: 2820 mL / OUT: 2465 mL / NET: 355 mL    27 May 2021 07:01  -  28 May 2021 05:16  --------------------------------------------------------  IN: 470 mL / OUT: 2650 mL / NET: -2180 mL      CAPILLARY BLOOD GLUCOSE          MEDICATIONS  (STANDING):  acetaminophen   Tablet .. 975 milliGRAM(s) Oral every 6 hours  enoxaparin Injectable 80 milliGRAM(s) SubCutaneous two times a day  ibuprofen  Tablet. 600 milliGRAM(s) Oral every 6 hours  pantoprazole    Tablet 40 milliGRAM(s) Oral before breakfast  sodium chloride 0.9% lock flush 3 milliLiter(s) IV Push every 8 hours    MEDICATIONS  (PRN):  oxyCODONE    IR 5 milliGRAM(s) Oral every 6 hours PRN Severe Pain (7 - 10)      Physical Exam:  Constitutional: NAD, A+O x3  CV: RRR  Lungs: clear to auscultation bilaterally  Abdomen: soft, nondistended, no guarding, no rebound, +bowel sounds; abdominal binder in place  Incision: pfannenstiel incision clean, dry, intact   Extremities: no lower extremity edema or calf tenderness bilaterally; venodynes in place    LABS:  05-27    138    |  105    |  5<L>   ----------------------------<  96     3.7     |  23     |  0.70     Ca    7.9<L>      27 May 2021 06:45  Phos  2.8       05-27  Mg     1.9       05-27

## 2021-05-29 ENCOUNTER — TRANSCRIPTION ENCOUNTER (OUTPATIENT)
Age: 61
End: 2021-05-29

## 2021-05-29 VITALS
HEART RATE: 98 BPM | SYSTOLIC BLOOD PRESSURE: 120 MMHG | RESPIRATION RATE: 18 BRPM | DIASTOLIC BLOOD PRESSURE: 71 MMHG | TEMPERATURE: 98 F | OXYGEN SATURATION: 100 %

## 2021-05-29 LAB
ANION GAP SERPL CALC-SCNC: 12 MMOL/L — SIGNIFICANT CHANGE UP (ref 7–14)
BASOPHILS # BLD AUTO: 0.06 K/UL — SIGNIFICANT CHANGE UP (ref 0–0.2)
BASOPHILS NFR BLD AUTO: 0.8 % — SIGNIFICANT CHANGE UP (ref 0–2)
BLD GP AB SCN SERPL QL: NEGATIVE — SIGNIFICANT CHANGE UP
BUN SERPL-MCNC: 13 MG/DL — SIGNIFICANT CHANGE UP (ref 7–23)
CALCIUM SERPL-MCNC: 8.3 MG/DL — LOW (ref 8.4–10.5)
CHLORIDE SERPL-SCNC: 110 MMOL/L — HIGH (ref 98–107)
CO2 SERPL-SCNC: 20 MMOL/L — LOW (ref 22–31)
CREAT SERPL-MCNC: 0.77 MG/DL — SIGNIFICANT CHANGE UP (ref 0.5–1.3)
EOSINOPHIL # BLD AUTO: 0.4 K/UL — SIGNIFICANT CHANGE UP (ref 0–0.5)
EOSINOPHIL NFR BLD AUTO: 5.3 % — SIGNIFICANT CHANGE UP (ref 0–6)
GLUCOSE SERPL-MCNC: 103 MG/DL — HIGH (ref 70–99)
HCT VFR BLD CALC: 23.3 % — LOW (ref 34.5–45)
HGB BLD-MCNC: 7.2 G/DL — LOW (ref 11.5–15.5)
IANC: 5.22 K/UL — SIGNIFICANT CHANGE UP (ref 1.5–8.5)
IMM GRANULOCYTES NFR BLD AUTO: 0.7 % — SIGNIFICANT CHANGE UP (ref 0–1.5)
LYMPHOCYTES # BLD AUTO: 1.37 K/UL — SIGNIFICANT CHANGE UP (ref 1–3.3)
LYMPHOCYTES # BLD AUTO: 18.1 % — SIGNIFICANT CHANGE UP (ref 13–44)
MAGNESIUM SERPL-MCNC: 2.1 MG/DL — SIGNIFICANT CHANGE UP (ref 1.6–2.6)
MCHC RBC-ENTMCNC: 28.3 PG — SIGNIFICANT CHANGE UP (ref 27–34)
MCHC RBC-ENTMCNC: 30.9 GM/DL — LOW (ref 32–36)
MCV RBC AUTO: 91.7 FL — SIGNIFICANT CHANGE UP (ref 80–100)
MONOCYTES # BLD AUTO: 0.45 K/UL — SIGNIFICANT CHANGE UP (ref 0–0.9)
MONOCYTES NFR BLD AUTO: 6 % — SIGNIFICANT CHANGE UP (ref 2–14)
NEUTROPHILS # BLD AUTO: 5.22 K/UL — SIGNIFICANT CHANGE UP (ref 1.8–7.4)
NEUTROPHILS NFR BLD AUTO: 69.1 % — SIGNIFICANT CHANGE UP (ref 43–77)
NRBC # BLD: 0 /100 WBCS — SIGNIFICANT CHANGE UP
NRBC # FLD: 0 K/UL — SIGNIFICANT CHANGE UP
PHOSPHATE SERPL-MCNC: 3.1 MG/DL — SIGNIFICANT CHANGE UP (ref 2.5–4.5)
PLATELET # BLD AUTO: 288 K/UL — SIGNIFICANT CHANGE UP (ref 150–400)
POTASSIUM SERPL-MCNC: 4.4 MMOL/L — SIGNIFICANT CHANGE UP (ref 3.5–5.3)
POTASSIUM SERPL-SCNC: 4.4 MMOL/L — SIGNIFICANT CHANGE UP (ref 3.5–5.3)
RBC # BLD: 2.54 M/UL — LOW (ref 3.8–5.2)
RBC # FLD: 15.4 % — HIGH (ref 10.3–14.5)
RH IG SCN BLD-IMP: NEGATIVE — SIGNIFICANT CHANGE UP
SODIUM SERPL-SCNC: 142 MMOL/L — SIGNIFICANT CHANGE UP (ref 135–145)
WBC # BLD: 7.55 K/UL — SIGNIFICANT CHANGE UP (ref 3.8–10.5)
WBC # FLD AUTO: 7.55 K/UL — SIGNIFICANT CHANGE UP (ref 3.8–10.5)

## 2021-05-29 RX ADMIN — SODIUM CHLORIDE 3 MILLILITER(S): 9 INJECTION INTRAMUSCULAR; INTRAVENOUS; SUBCUTANEOUS at 06:29

## 2021-05-29 RX ADMIN — RIVAROXABAN 15 MILLIGRAM(S): KIT at 08:29

## 2021-05-29 RX ADMIN — Medication 600 MILLIGRAM(S): at 08:29

## 2021-05-29 RX ADMIN — Medication 975 MILLIGRAM(S): at 08:08

## 2021-05-29 RX ADMIN — Medication 975 MILLIGRAM(S): at 07:16

## 2021-05-29 RX ADMIN — BENZOCAINE AND MENTHOL 1 LOZENGE: 5; 1 LIQUID ORAL at 01:24

## 2021-05-29 RX ADMIN — PANTOPRAZOLE SODIUM 40 MILLIGRAM(S): 20 TABLET, DELAYED RELEASE ORAL at 07:17

## 2021-05-29 NOTE — PROGRESS NOTE ADULT - SUBJECTIVE AND OBJECTIVE BOX
Gyn ONC Progress Note POD #3 HD#9 (incomplete note)    Subjective:   Pt seen and examined at bedside. No events overnight. Pain well controlled. Patient ambulating. Passing flatus. Tolerating regular diet. Pt denies fever, chills, chest pain, SOB, nausea, vomiting, lightheadedness, dizziness.      Objective:  T(F): 98.9 (05-29-21 @ 02:00), Max: 99.2 (05-28-21 @ 14:13)  HR: 92 (05-29-21 @ 02:00) (85 - 100)  BP: 112/52 (05-29-21 @ 02:00) (94/49 - 112/52)  RR: 18 (05-29-21 @ 02:00) (18 - 18)  SpO2: 99% (05-29-21 @ 02:00) (95% - 100%)  Wt(kg): --  I&O's Summary    27 May 2021 07:01  -  28 May 2021 07:00  --------------------------------------------------------  IN: 470 mL / OUT: 2650 mL / NET: -2180 mL    28 May 2021 07:01  -  29 May 2021 05:48  --------------------------------------------------------  IN: 940 mL / OUT: 1800 mL / NET: -860 mL      CAPILLARY BLOOD GLUCOSE          MEDICATIONS  (STANDING):  acetaminophen   Tablet .. 975 milliGRAM(s) Oral every 6 hours  ibuprofen  Tablet. 600 milliGRAM(s) Oral every 6 hours  pantoprazole    Tablet 40 milliGRAM(s) Oral before breakfast  rivaroxaban 15 milliGRAM(s) Oral two times a day with meals  sodium chloride 0.9% lock flush 3 milliLiter(s) IV Push every 8 hours    MEDICATIONS  (PRN):  benzocaine 15 mG/menthol 3.6 mG (Sugar-Free) Lozenge 1 Lozenge Oral four times a day PRN Sore Throat  petrolatum white Ointment 1 Application(s) Topical four times a day PRN skin irritation        Physical Exam:  Constitutional: NAD, A+O x3  CV: RRR  Lungs: clear to auscultation bilaterally  Abdomen: soft, nondistended, no guarding, no rebound, +bowel sounds; abdominal binder in place  Incision: pfannenstiel incision clean, dry, intact   Extremities: no lower extremity edema or calf tenderness bilaterally; venodynes in place      LABS:             7.7    9.39  )-----------( 215      ( 05-28 @ 06:35 )             23.9         05-28    141    |  107    |  7      ----------------------------<  100<H>  3.5     |  24     |  0.74     Ca    8.1<L>      28 May 2021 06:35  Phos  3.0       05-28  Mg     2.0       05-28                Assessment/Plan: 60y female POD# , s/p     CV: Hemodynamically stable  Pulm: Saturating well on RA. Increase incentive spirometry.  GI: Advance diet as tolerated  : Salazar in place. Adequate UOP  Heme: Continue HSQ/Lovenox/Venodynes for DVT ppx. Increase OOB.    Neuro: PCA for pain control. // Continue oral meds for pain control.         Gyn ONC Progress Note POD #3 HD#9     Subjective:   Pt seen and examined at bedside. No events overnight. Pain well controlled. Patient ambulating. **Passing flatus. Tolerating regular diet. Pt denies fever, chills, chest pain, SOB, nausea, vomiting, lightheadedness, dizziness.      Objective:  T(F): 98.9 (05-29-21 @ 02:00), Max: 99.2 (05-28-21 @ 14:13)  HR: 92 (05-29-21 @ 02:00) (85 - 100)  BP: 112/52 (05-29-21 @ 02:00) (94/49 - 112/52)  RR: 18 (05-29-21 @ 02:00) (18 - 18)  SpO2: 99% (05-29-21 @ 02:00) (95% - 100%)  Wt(kg): --  I&O's Summary    27 May 2021 07:01  -  28 May 2021 07:00  --------------------------------------------------------  IN: 470 mL / OUT: 2650 mL / NET: -2180 mL    28 May 2021 07:01  -  29 May 2021 05:48  --------------------------------------------------------  IN: 940 mL / OUT: 1800 mL / NET: -860 mL      CAPILLARY BLOOD GLUCOSE          MEDICATIONS  (STANDING):  acetaminophen   Tablet .. 975 milliGRAM(s) Oral every 6 hours  ibuprofen  Tablet. 600 milliGRAM(s) Oral every 6 hours  pantoprazole    Tablet 40 milliGRAM(s) Oral before breakfast  rivaroxaban 15 milliGRAM(s) Oral two times a day with meals  sodium chloride 0.9% lock flush 3 milliLiter(s) IV Push every 8 hours    MEDICATIONS  (PRN):  benzocaine 15 mG/menthol 3.6 mG (Sugar-Free) Lozenge 1 Lozenge Oral four times a day PRN Sore Throat  petrolatum white Ointment 1 Application(s) Topical four times a day PRN skin irritation        Physical Exam:  Constitutional: NAD, A+O x3  CV: RRR  Lungs: clear to auscultation bilaterally  Abdomen: soft, nondistended, no guarding, no rebound, +bowel sounds; abdominal binder in place  Incision: pfannenstiel incision clean, dry, intact   Extremities: no lower extremity edema or calf tenderness bilaterally; venodynes in place      LABS:             7.7    9.39  )-----------( 215      ( 05-28 @ 06:35 )             23.9         05-28    141    |  107    |  7      ----------------------------<  100<H>  3.5     |  24     |  0.74     Ca    8.1<L>      28 May 2021 06:35  Phos  3.0       05-28  Mg     2.0       05-28

## 2021-05-29 NOTE — PROGRESS NOTE ADULT - ASSESSMENT
60y female HD#9 admitted w/ vaginal bleeding and symptomatic anemia, POD#3, s/p ex-lap, EDISON, BSO (frozen=benign). Patient is doing well, vaginal bleeding has resolved, and she is meeting all postoperative milestones.         CV: Hemodynamically stable, f/u AM CBC.  Pulm: Saturating well on RA. Continue incentive spirometry.  GI: Tolerating regular diet.   : Adequate UOP. f/u AM BMP/Mg/Phos, replete PRN.   Heme: Continue Lovenox 80 BID for DVT ppx. Transition to Xarelto tonight. Increase OOB.    Neuro: Continue oral meds for pain control. 60y female HD#9 admitted w/ vaginal bleeding and symptomatic anemia, POD#3, s/p ex-lap, EDISON, BSO (frozen=benign). Patient is doing well, vaginal bleeding has resolved, and she is meeting all postoperative milestones.

## 2021-05-29 NOTE — PROGRESS NOTE ADULT - PROBLEM SELECTOR PROBLEM 1
Vaginal bleeding
Vaginal bleeding
Postoperative state
Vaginal bleeding
Postoperative state
Vaginal bleeding

## 2021-05-29 NOTE — DISCHARGE NOTE NURSING/CASE MANAGEMENT/SOCIAL WORK - PATIENT PORTAL LINK FT
You can access the FollowMyHealth Patient Portal offered by [facility] by registering at the following website: [email or web address]. By joining [email or web address]’s FollowMyHealth portal, you will also be able to view your health information using other applications (apps) compatible with our system.

## 2021-05-29 NOTE — PROGRESS NOTE ADULT - REASON FOR ADMISSION
vaginal bleeding

## 2021-05-29 NOTE — PROGRESS NOTE ADULT - PROBLEM SELECTOR PLAN 2
Fellow Note    Patient seen and examined. Agree with above.    VS reviewed  Labs reviewed    Reg diet  OOB  Continue Xarelto  PO analgesia  Plan for dc home. Instructions reviewed. All questions answered. Will follow up with Dr. Hewitt in 2 weeks.      Marley HENRIQUEZ

## 2021-05-29 NOTE — DISCHARGE NOTE NURSING/CASE MANAGEMENT/SOCIAL WORK - NSDCPNINST_GEN_ALL_CORE
Please return to ED if you develop any nausea, vomiting, diarrhea of temp of 100.4 and greater. Notify your provider if you develop pain unrelieved with ordered pain meds, or any pus like drainage from incision sites.

## 2021-05-29 NOTE — PROGRESS NOTE ADULT - PROBLEM SELECTOR PLAN 1
CV: Hemodynamically stable, f/u AM CBC.  Pulm: Saturating well on RA. Continue incentive spirometry.  GI: Tolerating regular diet. Protonix on board.   : Adequate UOP.  FEN: f/u AM BMP/Mg/Phos, replete PRN.   Heme: Transitioned to Xarelto (5/28-) pm. NO vaginal bleeding noted.  Increase OOB.    Neuro: Continue Tylenol, Motrin, Oxy PRN for pain control.    ADomney PGY-2

## 2021-05-29 NOTE — PROGRESS NOTE ADULT - ATTENDING COMMENTS
Patient seen and evaluated.   CT scan reviewed.   Large uterus with fibroids and adenomyosis.   AEH.   For EDISON, BSO possible staging, open approach.
Patient seen and examined, agree with gyn housestaff  Plan for OR tomorrow  Hgb stable  For IVC filter today
Patient seen and examined at bedside with team at 7:15am, agree with above gyn resident / fellow note, including assessment and plan. Abdomen benign. Discussed today's plan of care with patient, all questions answered. Continue to monitor closely.
Patient seen and examined at bedside, agree with above gyn resident/fellow note, including assessment and plan. Abdomen benign, bleeding moderate overnight. Check CBC after 3rd unit, continue megace. Discussed today's plan of care with patient, all questions answered. Continue to monitor bleeding closely.
Patient seen and examined.   Doing well after surgery.   Agree with assessment and plan as written by fellow and housestaff.   DC home today.
Patient feeling better, c/o blisters along inner thighs b/l  sebastian reg diet, will start xarelto tonight  voiding trial  oob  apply vazeline to area of blisters, improving already
Patient seen and examined.   Feels well. No bleeding.   Pain well controlled.   GUMARO chavarria now.   Repeat CBC tomorrow.   Low Hgb but no symptoms of anemia at this time.

## 2021-06-01 ENCOUNTER — NON-APPOINTMENT (OUTPATIENT)
Age: 61
End: 2021-06-01

## 2021-06-01 DIAGNOSIS — Z71.89 OTHER SPECIFIED COUNSELING: ICD-10-CM

## 2021-06-07 PROBLEM — N95.0 POSTMENOPAUSAL BLEEDING: Status: ACTIVE | Noted: 2021-05-12

## 2021-06-07 LAB — SURGICAL PATHOLOGY STUDY: SIGNIFICANT CHANGE UP

## 2021-06-08 ENCOUNTER — APPOINTMENT (OUTPATIENT)
Dept: GYNECOLOGIC ONCOLOGY | Facility: CLINIC | Age: 61
End: 2021-06-08
Payer: MEDICAID

## 2021-06-08 VITALS
TEMPERATURE: 97 F | BODY MASS INDEX: 28.93 KG/M2 | WEIGHT: 180 LBS | SYSTOLIC BLOOD PRESSURE: 130 MMHG | HEIGHT: 66 IN | DIASTOLIC BLOOD PRESSURE: 77 MMHG | HEART RATE: 68 BPM

## 2021-06-08 DIAGNOSIS — N95.0 POSTMENOPAUSAL BLEEDING: ICD-10-CM

## 2021-06-08 PROCEDURE — 99024 POSTOP FOLLOW-UP VISIT: CPT

## 2021-06-08 RX ORDER — AMOXICILLIN 875 MG/1
875 TABLET, FILM COATED ORAL
Qty: 20 | Refills: 0 | Status: DISCONTINUED | COMMUNITY
Start: 2018-02-01 | End: 2021-06-08

## 2021-06-08 RX ORDER — FLUCONAZOLE 150 MG/1
150 TABLET ORAL
Qty: 2 | Refills: 0 | Status: DISCONTINUED | COMMUNITY
Start: 2018-02-01 | End: 2021-06-08

## 2021-06-08 RX ORDER — CEPHALEXIN 500 MG/1
500 TABLET ORAL
Qty: 9 | Refills: 0 | Status: DISCONTINUED | COMMUNITY
Start: 2018-01-26 | End: 2021-06-08

## 2021-06-08 RX ORDER — FLUTICASONE PROPIONATE 50 UG/1
50 SPRAY, METERED NASAL
Qty: 16 | Refills: 0 | Status: DISCONTINUED | COMMUNITY
Start: 2017-10-02 | End: 2021-06-08

## 2021-06-08 RX ORDER — NYSTATIN 100000 [USP'U]/G
100000 CREAM TOPICAL
Qty: 30 | Refills: 0 | Status: DISCONTINUED | COMMUNITY
Start: 2017-09-08 | End: 2021-06-08

## 2021-06-08 RX ORDER — TRIAMCINOLONE ACETONIDE 1 MG/G
0.1 CREAM TOPICAL
Qty: 30 | Refills: 0 | Status: DISCONTINUED | COMMUNITY
Start: 2017-09-08 | End: 2021-06-08

## 2021-06-08 RX ORDER — STANDARDIZED SENNA CONCENTRATE 8.6 MG/1
8.6 TABLET ORAL
Qty: 30 | Refills: 0 | Status: DISCONTINUED | COMMUNITY
Start: 2018-01-26 | End: 2021-06-08

## 2021-06-08 RX ORDER — ESTRADIOL 0.1 MG/D
0.1 PATCH, EXTENDED RELEASE TRANSDERMAL
Qty: 8 | Refills: 0 | Status: DISCONTINUED | COMMUNITY
Start: 2017-05-31 | End: 2021-06-08

## 2021-06-08 RX ORDER — CLOTRIMAZOLE AND BETAMETHASONE DIPROPIONATE 10; .5 MG/G; MG/G
1-0.05 CREAM TOPICAL
Qty: 45 | Refills: 0 | Status: DISCONTINUED | COMMUNITY
Start: 2018-02-01 | End: 2021-06-08

## 2021-06-08 RX ORDER — OXYCODONE AND ACETAMINOPHEN 5; 325 MG/1; MG/1
5-325 TABLET ORAL
Qty: 53 | Refills: 0 | Status: DISCONTINUED | COMMUNITY
Start: 2018-01-26 | End: 2021-06-08

## 2021-06-08 RX ORDER — LEVOCETIRIZINE DIHYDROCHLORIDE 5 MG/1
5 TABLET ORAL
Qty: 30 | Refills: 0 | Status: DISCONTINUED | COMMUNITY
Start: 2017-10-02 | End: 2021-06-08

## 2021-06-08 NOTE — LETTER BODY
[Dear  ___] : Dear  [unfilled], [I recently saw our patient [unfilled] for a follow-up visit.] : I recently saw our patient, [unfilled] for a follow-up visit. [Attached please find my note.] : Attached please find my note. [[email or web address]] : Pathology and Operative report 5/26/21

## 2021-06-08 NOTE — REASON FOR VISIT
[Post Op] : post op visit [de-identified] : 5/26/21 [de-identified] : EDISON BRYANT, BSO [de-identified] : Initially scheduled as outpatient procedure. Presented to [facility] ED with heavy vaginal bleeding. Developed leg pain and was diagnosed with bilateral below the knee DVTs. Required multiple units of PRBC transfusion. Ultimately proceeded to the OR for ELAP, EDISON and BSO. Discharged home on Eliquis. 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 \par \par She feels well and is recovering appropriately. Denies vaginal bleeding, discharge or pelvic pain. Tolerating PO without N/V. Denies fever, chills or calf pain. Bowel and bladder functioning normally. No longer requiring pain medication.\par

## 2021-06-08 NOTE — DISCUSSION/SUMMARY
[Clean] : was clean [Dry] : was dry [Intact] : was intact [None] : had no drainage [Normal Skin] : normal appearance [Doing Well] : is doing well [Excellent Pain Control] : has excellent pain control [No Sign of Infection] : is showing no signs of infection [Reviewed] : reviewed [0] : 0 [FreeTextEntry9] : Pfannenstiel incision healing [de-identified] : Cuff intact

## 2021-06-21 ENCOUNTER — OUTPATIENT (OUTPATIENT)
Dept: OUTPATIENT SERVICES | Facility: HOSPITAL | Age: 61
LOS: 1 days | Discharge: HOME | End: 2021-06-21

## 2021-06-21 DIAGNOSIS — Z98.890 OTHER SPECIFIED POSTPROCEDURAL STATES: Chronic | ICD-10-CM

## 2021-06-21 DIAGNOSIS — Z96.7 PRESENCE OF OTHER BONE AND TENDON IMPLANTS: Chronic | ICD-10-CM

## 2021-06-21 DIAGNOSIS — D68.59 OTHER PRIMARY THROMBOPHILIA: ICD-10-CM

## 2021-06-21 DIAGNOSIS — Z87.59 PERSONAL HISTORY OF OTHER COMPLICATIONS OF PREGNANCY, CHILDBIRTH AND THE PUERPERIUM: Chronic | ICD-10-CM

## 2021-06-24 ENCOUNTER — RESULT REVIEW (OUTPATIENT)
Age: 61
End: 2021-06-24

## 2021-06-24 ENCOUNTER — APPOINTMENT (OUTPATIENT)
Dept: HEMATOLOGY ONCOLOGY | Facility: CLINIC | Age: 61
End: 2021-06-24
Payer: MEDICAID

## 2021-06-24 VITALS
DIASTOLIC BLOOD PRESSURE: 77 MMHG | WEIGHT: 179.65 LBS | BODY MASS INDEX: 28.87 KG/M2 | RESPIRATION RATE: 16 BRPM | HEART RATE: 80 BPM | OXYGEN SATURATION: 100 % | SYSTOLIC BLOOD PRESSURE: 122 MMHG | HEIGHT: 66 IN | TEMPERATURE: 97.4 F

## 2021-06-24 DIAGNOSIS — Z60.2 PROBLEMS RELATED TO LIVING ALONE: ICD-10-CM

## 2021-06-24 DIAGNOSIS — Z87.891 PERSONAL HISTORY OF NICOTINE DEPENDENCE: ICD-10-CM

## 2021-06-24 DIAGNOSIS — N85.02 ENDOMETRIAL INTRAEPITHELIAL NEOPLASIA [EIN]: ICD-10-CM

## 2021-06-24 LAB
BASOPHILS # BLD AUTO: 0.09 K/UL — SIGNIFICANT CHANGE UP (ref 0–0.2)
BASOPHILS NFR BLD AUTO: 1.4 % — SIGNIFICANT CHANGE UP (ref 0–2)
EOSINOPHIL # BLD AUTO: 0.14 K/UL — SIGNIFICANT CHANGE UP (ref 0–0.5)
EOSINOPHIL NFR BLD AUTO: 2.2 % — SIGNIFICANT CHANGE UP (ref 0–6)
HCT VFR BLD CALC: 29.9 % — LOW (ref 34.5–45)
HGB BLD-MCNC: 9.1 G/DL — LOW (ref 11.5–15.5)
IMM GRANULOCYTES NFR BLD AUTO: 0.3 % — SIGNIFICANT CHANGE UP (ref 0–1.5)
LYMPHOCYTES # BLD AUTO: 1.42 K/UL — SIGNIFICANT CHANGE UP (ref 1–3.3)
LYMPHOCYTES # BLD AUTO: 21.9 % — SIGNIFICANT CHANGE UP (ref 13–44)
MCHC RBC-ENTMCNC: 27.1 PG — SIGNIFICANT CHANGE UP (ref 27–34)
MCHC RBC-ENTMCNC: 30.4 G/DL — LOW (ref 32–36)
MCV RBC AUTO: 89 FL — SIGNIFICANT CHANGE UP (ref 80–100)
MONOCYTES # BLD AUTO: 0.43 K/UL — SIGNIFICANT CHANGE UP (ref 0–0.9)
MONOCYTES NFR BLD AUTO: 6.6 % — SIGNIFICANT CHANGE UP (ref 2–14)
NEUTROPHILS # BLD AUTO: 4.37 K/UL — SIGNIFICANT CHANGE UP (ref 1.8–7.4)
NEUTROPHILS NFR BLD AUTO: 67.6 % — SIGNIFICANT CHANGE UP (ref 43–77)
NRBC # BLD: 0 /100 WBCS — SIGNIFICANT CHANGE UP (ref 0–0)
PLATELET # BLD AUTO: 281 K/UL — SIGNIFICANT CHANGE UP (ref 150–400)
RBC # BLD: 3.36 M/UL — LOW (ref 3.8–5.2)
RBC # FLD: 15.1 % — HIGH (ref 10.3–14.5)
WBC # BLD: 6.47 K/UL — SIGNIFICANT CHANGE UP (ref 3.8–10.5)
WBC # FLD AUTO: 6.47 K/UL — SIGNIFICANT CHANGE UP (ref 3.8–10.5)

## 2021-06-24 PROCEDURE — 99215 OFFICE O/P EST HI 40 MIN: CPT

## 2021-06-24 SDOH — SOCIAL STABILITY - SOCIAL INSECURITY: PROBLEMS RELATED TO LIVING ALONE: Z60.2

## 2021-06-24 NOTE — HISTORY OF PRESENT ILLNESS
[Date: ____________] : Patient's last distress assessment performed on [unfilled]. [Disease:__________________________] : Disease: [unfilled] [Cardiovascular] : Cardiovascular [Constitutional] : Constitutional [ENT] : ENT [Dermatologic] : Dermatologic [Endocrine] : Endocrine [Gastrointestinal] : Gastrointestinal [Genitourinary] : Genitourinary [Gynecologic] : Gynecologic [Infectious] : Infectious [Musculoskeletal] : Musculoskeletal [Neurologic] : Neurologic [Pain] : Pain [Pulmonary] : Pulmonary [Hematologic] : Hematologic [de-identified] : Ratna Vincent has been treated for 5 years with a 1% estradiol patch when first seen by GYN service in 01/2021 for vaginal spotting. She was treated with  a dilatation and curettage on 04/16/2021 and post operatively she received oral Megace. She was bleeding through out April. She discontinued Megace on 05/18 /2021 and was  was first seen by Dr Shannon Hewitt for evaluation for outpatient GYN surgery including abdominal hysterectomy.\par On May 25 2021 she had a near syncopal episode prior to  her shower and she presented to the [facility] ER with heavy vaginal bleeding and leg pain.CT scan of the abdomen showed a enlarged myomatous uterus with abnormal thickening of the endometrium  and presence of blood in the uterus cavity. CBC WBC 12.8 HGB 6.6  000.\par  She was diagnosed to have bilateral deep venous thrombosis located below the knee. Deep venous clots were noted in the left soleal vein (calf) and right saphenous system below the knee. She was seen by hematology service and anticoagulation was held due to low probability of pulmonary embolism due to status of clot below the knee; although venous filter was considered it was not placed.  \par She received multiple units of packed red cells and on 05/26/2021 she was treated with inpatient surgery for exploratory laparotomy,EDISON and BSO. Uterine pathology showed simple and complex endometrial hyperplasia with atypia. \par On June 8 2021  She was seen in the outpatient office on 06/08/2021 and was recovering. No vaginal bleeding and taking oral feeding without nausea or vomiting. No post operative fever no chills and no calf pain. Bowel and bladder functioning normally.\par She is currently on rivaroxaban 15 mg PO BID [de-identified] : She has been taking rivaroxaban twice daily. She is reminded by me of the dosing of 15 mg PO BID for 21 days to be followed by 20 mg PO daily

## 2021-06-24 NOTE — ASSESSMENT
[Supportive] : Goals of care discussed with patient: Supportive [Palliative Care Plan] : not applicable at this time [FreeTextEntry1] : JOSE ARMANDO Vincent is a 60 year old female with a history of heavy menstruation and she has prior use hormone treatment. Significant blood loss through April and prior uterine biopsy D and C ; she developed blood loss anemia; She is status post blood transfusion May 21 through May 25 and EDISON BSO; benign pathology.\par I agree with the use of oral iron and I  recommended the use of 2 tablets of 65 mg PO every other day with lemon juice.\par I discussed the 21 day induction of rivaroxaban 15 mg PO BID and then a change to 20 mg PO daily.\par Patietn will have repeat US of the bilateral lower extremities. Physical examination is normal and no significant leg edema is seen . She is fully ambulatory and there is no bleeding. RTC 1 months

## 2021-06-24 NOTE — REASON FOR VISIT
[Initial Consultation] : an initial consultation for [Blood Count Assessment] : blood count assessment [Coagulopathy] : coagulopathy [FreeTextEntry2] : bilateral deep venous thrombosis

## 2021-06-25 LAB
ALBUMIN SERPL ELPH-MCNC: 4.4 G/DL
ALP BLD-CCNC: 70 U/L
ALT SERPL-CCNC: 12 U/L
ANION GAP SERPL CALC-SCNC: 12 MMOL/L
APTT BLD: 44.8 SEC
AST SERPL-CCNC: 25 U/L
BILIRUB SERPL-MCNC: 0.2 MG/DL
BUN SERPL-MCNC: 13 MG/DL
CALCIUM SERPL-MCNC: 9.1 MG/DL
CHLORIDE SERPL-SCNC: 103 MMOL/L
CO2 SERPL-SCNC: 25 MMOL/L
CREAT SERPL-MCNC: 0.88 MG/DL
DEPRECATED D DIMER PPP IA-ACNC: 156 NG/ML DDU
FIBRINOGEN PPP COAG.DERIVED-MCNC: 508 MG/DL
GLUCOSE SERPL-MCNC: 97 MG/DL
INR PPP: 2.53 RATIO
POTASSIUM SERPL-SCNC: 4.4 MMOL/L
PROT SERPL-MCNC: 6.5 G/DL
PT BLD: 28.6 SEC
SODIUM SERPL-SCNC: 140 MMOL/L

## 2021-06-28 LAB
CARDIOLIPIN AB SER IA-ACNC: NEGATIVE
CARDIOLIPIN IGM SER-MCNC: <5 GPL
PROT S AG ACT/NOR PPP IA: 73 %

## 2021-07-01 ENCOUNTER — APPOINTMENT (OUTPATIENT)
Dept: ULTRASOUND IMAGING | Facility: IMAGING CENTER | Age: 61
End: 2021-07-01
Payer: MEDICAID

## 2021-07-01 ENCOUNTER — OUTPATIENT (OUTPATIENT)
Dept: OUTPATIENT SERVICES | Facility: HOSPITAL | Age: 61
LOS: 1 days | End: 2021-07-01
Payer: MEDICAID

## 2021-07-01 DIAGNOSIS — Z96.7 PRESENCE OF OTHER BONE AND TENDON IMPLANTS: Chronic | ICD-10-CM

## 2021-07-01 DIAGNOSIS — Z87.59 PERSONAL HISTORY OF OTHER COMPLICATIONS OF PREGNANCY, CHILDBIRTH AND THE PUERPERIUM: Chronic | ICD-10-CM

## 2021-07-01 DIAGNOSIS — Z00.8 ENCOUNTER FOR OTHER GENERAL EXAMINATION: ICD-10-CM

## 2021-07-01 DIAGNOSIS — Z98.890 OTHER SPECIFIED POSTPROCEDURAL STATES: Chronic | ICD-10-CM

## 2021-07-01 DIAGNOSIS — I82.409 ACUTE EMBOLISM AND THROMBOSIS OF UNSPECIFIED DEEP VEINS OF UNSPECIFIED LOWER EXTREMITY: ICD-10-CM

## 2021-07-01 LAB
APCR PPP: 3.7 RATIO
B2 GLYCOPROT1 IGA SERPL IA-ACNC: >150 SAU
FVL BLANK: 40.2
FVL TEST: 148.7
PROT S FREE PPP-ACNC: 105 %
PTR INTERP: NORMAL

## 2021-07-01 PROCEDURE — 93970 EXTREMITY STUDY: CPT | Mod: 26

## 2021-07-01 PROCEDURE — 93970 EXTREMITY STUDY: CPT

## 2021-07-12 LAB — CARDIOLIPIN IGM SER-MCNC: 15 MPL

## 2021-07-26 ENCOUNTER — OUTPATIENT (OUTPATIENT)
Dept: OUTPATIENT SERVICES | Facility: HOSPITAL | Age: 61
LOS: 1 days | Discharge: HOME | End: 2021-07-26

## 2021-07-26 DIAGNOSIS — D68.59 OTHER PRIMARY THROMBOPHILIA: ICD-10-CM

## 2021-07-26 DIAGNOSIS — Z98.890 OTHER SPECIFIED POSTPROCEDURAL STATES: Chronic | ICD-10-CM

## 2021-07-26 DIAGNOSIS — Z96.7 PRESENCE OF OTHER BONE AND TENDON IMPLANTS: Chronic | ICD-10-CM

## 2021-07-26 DIAGNOSIS — Z87.59 PERSONAL HISTORY OF OTHER COMPLICATIONS OF PREGNANCY, CHILDBIRTH AND THE PUERPERIUM: Chronic | ICD-10-CM

## 2021-07-28 ENCOUNTER — RESULT REVIEW (OUTPATIENT)
Age: 61
End: 2021-07-28

## 2021-07-28 ENCOUNTER — APPOINTMENT (OUTPATIENT)
Dept: HEMATOLOGY ONCOLOGY | Facility: CLINIC | Age: 61
End: 2021-07-28
Payer: MEDICAID

## 2021-07-28 VITALS
SYSTOLIC BLOOD PRESSURE: 126 MMHG | HEART RATE: 84 BPM | HEIGHT: 65.94 IN | WEIGHT: 179.88 LBS | RESPIRATION RATE: 18 BRPM | TEMPERATURE: 97.8 F | BODY MASS INDEX: 29.26 KG/M2 | OXYGEN SATURATION: 97 % | DIASTOLIC BLOOD PRESSURE: 78 MMHG

## 2021-07-28 LAB
BASOPHILS # BLD AUTO: 0.09 K/UL — SIGNIFICANT CHANGE UP (ref 0–0.2)
BASOPHILS NFR BLD AUTO: 1.6 % — SIGNIFICANT CHANGE UP (ref 0–2)
EOSINOPHIL # BLD AUTO: 0.08 K/UL — SIGNIFICANT CHANGE UP (ref 0–0.5)
EOSINOPHIL NFR BLD AUTO: 1.4 % — SIGNIFICANT CHANGE UP (ref 0–6)
HCT VFR BLD CALC: 35.7 % — SIGNIFICANT CHANGE UP (ref 34.5–45)
HGB BLD-MCNC: 11.1 G/DL — LOW (ref 11.5–15.5)
IMM GRANULOCYTES NFR BLD AUTO: 0.4 % — SIGNIFICANT CHANGE UP (ref 0–1.5)
LYMPHOCYTES # BLD AUTO: 1.56 K/UL — SIGNIFICANT CHANGE UP (ref 1–3.3)
LYMPHOCYTES # BLD AUTO: 27.5 % — SIGNIFICANT CHANGE UP (ref 13–44)
MCHC RBC-ENTMCNC: 26 PG — LOW (ref 27–34)
MCHC RBC-ENTMCNC: 31.1 G/DL — LOW (ref 32–36)
MCV RBC AUTO: 83.6 FL — SIGNIFICANT CHANGE UP (ref 80–100)
MONOCYTES # BLD AUTO: 0.45 K/UL — SIGNIFICANT CHANGE UP (ref 0–0.9)
MONOCYTES NFR BLD AUTO: 7.9 % — SIGNIFICANT CHANGE UP (ref 2–14)
NEUTROPHILS # BLD AUTO: 3.47 K/UL — SIGNIFICANT CHANGE UP (ref 1.8–7.4)
NEUTROPHILS NFR BLD AUTO: 61.2 % — SIGNIFICANT CHANGE UP (ref 43–77)
NRBC # BLD: 0 /100 WBCS — SIGNIFICANT CHANGE UP (ref 0–0)
PLATELET # BLD AUTO: 270 K/UL — SIGNIFICANT CHANGE UP (ref 150–400)
RBC # BLD: 4.27 M/UL — SIGNIFICANT CHANGE UP (ref 3.8–5.2)
RBC # FLD: 16.4 % — HIGH (ref 10.3–14.5)
WBC # BLD: 5.67 K/UL — SIGNIFICANT CHANGE UP (ref 3.8–10.5)
WBC # FLD AUTO: 5.67 K/UL — SIGNIFICANT CHANGE UP (ref 3.8–10.5)

## 2021-07-28 PROCEDURE — 99213 OFFICE O/P EST LOW 20 MIN: CPT

## 2021-08-10 ENCOUNTER — TRANSCRIPTION ENCOUNTER (OUTPATIENT)
Age: 61
End: 2021-08-10

## 2021-08-21 RX ORDER — PERPHENAZINE 2 MG
TABLET ORAL
Refills: 0 | Status: ACTIVE | COMMUNITY
Start: 2021-08-21

## 2021-08-21 RX ORDER — FLAXSEED OIL 1000 MG
1000 CAPSULE ORAL
Refills: 0 | Status: ACTIVE | COMMUNITY
Start: 2021-08-21

## 2021-08-22 NOTE — RESULTS/DATA
[FreeTextEntry1] : 7/1/21 US L E  Normal compressibility of the RIGHT common femoral, femoral and popliteal veins. Doppler examination shows normal spontaneous and phasic flow.\par Trace amount of residual clot is seen involving the sigmoid mesentery Persistent thrombosis involving saphenous vein involving the distal thigh into the popliteal fossa region. There is no propagation superiorly\par  \par B2  Glycoprotein 1 IgA Ab 150.0 ABHIJEET   \par Total ACL antibody level negative\par   Cardiolipin Ab IgM 15  (NL 12)   \par   Cardiolipin AB IgG <5\par \par Protein Gene Mutation negative \par Protein S Antigen Assay Total 73%\par D Dimer 156\par Fibrinogen 508

## 2021-08-22 NOTE — REASON FOR VISIT
[Follow-Up Visit] : a follow-up visit for [Blood Count Assessment] : blood count assessment [Coagulopathy] : coagulopathy [FreeTextEntry2] : anemia, bilateral DVT

## 2021-08-22 NOTE — ASSESSMENT
[Supportive] : Goals of care discussed with patient: Supportive [Palliative Care Plan] : not applicable at this time [FreeTextEntry1] : JOSE ARMANDO Vincent is a 60 year old female with a history of heavy menstruation and she has prior use hormone treatment. Significant blood loss through April and prior uterine biopsy D and C ; she developed blood loss anemia; She is status post blood transfusion May 21 through May 25 and EDISON BSO; benign pathology.\par \par She is currently tolerating iron 2 tabs of 65 mg po every other day with citrus. \par Her bilateral venous Doppler study on July 1 shows persistent thrombus involving the right mid and distal gastrocnemius vein as well as small amount clot persistent right gastrocnemius vein without propagation.  \par Her exercise tolerance is improved and she is having no bleeding.\par \par Plan \par Continue Iron 2 tabs of 65 mg po every other day with citrus\par Continue Xarelto 20 mg po daily \par Indeterminant  Cardiolipin Ab IgM with elevated ;  Beta2 Glycoprotein IgA Ab\par FU one month with repeat Beta2 Glycoprotein IgA Ab CBC

## 2021-08-22 NOTE — HISTORY OF PRESENT ILLNESS
[Disease:__________________________] : Disease: [unfilled] [de-identified] : Ratna Vincent has been treated for 5 years with a 1% estradiol patch when first seen by GYN service in 01/2021 for vaginal spotting. She was treated with  a dilatation and curettage on 04/16/2021 and post operatively she received oral Megace. She was bleeding through out April. She discontinued Megace on 05/18 /2021 and was  was first seen by Dr Shannon Hewitt for evaluation for outpatient GYN surgery including abdominal hysterectomy.\par On May 25 2021 she had a near syncopal episode prior to  her shower and she presented to the [facility] ER with heavy vaginal bleeding and leg pain.CT scan of the abdomen showed a enlarged myomatous uterus with abnormal thickening of the endometrium  and presence of blood in the uterus cavity. CBC WBC 12.8 HGB 6.6  000.\par  She was diagnosed to have bilateral deep venous thrombosis located below the knee. Deep venous clots were noted in the left soleal vein (calf) and right saphenous system below the knee. She was seen by hematology service and anticoagulation was held due to low probability of pulmonary embolism due to status of clot below the knee; although venous filter was considered it was not placed.  \par She received multiple units of packed red cells and on 05/26/2021 she was treated with inpatient surgery for exploratory laparotomy,EDISON and BSO. Uterine pathology showed simple and complex endometrial hyperplasia with atypia. \par  She was seen in the outpatient office on 06/08/2021 and was recovering. No vaginal bleeding and taking oral feeding without nausea or vomiting. No post operative fever no chills and no calf pain. Bowel and bladder functioning normally.\par She was discharged on rivaroxaban 15 mg PO BID [FreeTextEntry1] : Xarelto 20 mg daily [de-identified] : Additional supplements: Flax seed, Resveritrol, Iron mvi  fish oil 2 tabs started evening primrose for mild hot flashes; omega 369; hyaluronic acid hair skin nail vitamin turmeric\par Xarelto 20 mg; soy 1-2 times per day \par \par 7/1/21 Venous Doppler bilateral LE:  thrombus involving the right mid and distal gastrocnemius vein as well as small amount clot persistent right gastrocnemius vein\par \par She describes right leg ball of foot cramps when awakening in the AM and after walking more than she is normally used to but this improves consistently as she increases her activity.\par Denies SOB with activity. Had one episode of fleeting needle like pain in chest . Her fatigue and  activity tolerance have improved\par \par

## 2021-08-23 ENCOUNTER — APPOINTMENT (OUTPATIENT)
Dept: HEMATOLOGY ONCOLOGY | Facility: CLINIC | Age: 61
End: 2021-08-23
Payer: MEDICAID

## 2021-08-23 ENCOUNTER — RESULT REVIEW (OUTPATIENT)
Age: 61
End: 2021-08-23

## 2021-08-23 VITALS
TEMPERATURE: 98 F | BODY MASS INDEX: 28.51 KG/M2 | OXYGEN SATURATION: 99 % | HEART RATE: 74 BPM | RESPIRATION RATE: 14 BRPM | SYSTOLIC BLOOD PRESSURE: 130 MMHG | WEIGHT: 176.37 LBS | DIASTOLIC BLOOD PRESSURE: 81 MMHG

## 2021-08-23 LAB
ALBUMIN SERPL ELPH-MCNC: 4.3 G/DL
ALP BLD-CCNC: 63 U/L
ALT SERPL-CCNC: 12 U/L
ANION GAP SERPL CALC-SCNC: 10 MMOL/L
AST SERPL-CCNC: 19 U/L
BASOPHILS # BLD AUTO: 0.04 K/UL — SIGNIFICANT CHANGE UP (ref 0–0.2)
BASOPHILS NFR BLD AUTO: 0.7 % — SIGNIFICANT CHANGE UP (ref 0–2)
BILIRUB SERPL-MCNC: 0.3 MG/DL
BUN SERPL-MCNC: 10 MG/DL
CALCIUM SERPL-MCNC: 9.3 MG/DL
CHLORIDE SERPL-SCNC: 105 MMOL/L
CO2 SERPL-SCNC: 26 MMOL/L
CREAT SERPL-MCNC: 0.77 MG/DL
EOSINOPHIL # BLD AUTO: 0.1 K/UL — SIGNIFICANT CHANGE UP (ref 0–0.5)
EOSINOPHIL NFR BLD AUTO: 1.8 % — SIGNIFICANT CHANGE UP (ref 0–6)
GLUCOSE SERPL-MCNC: 96 MG/DL
HCT VFR BLD CALC: 37.5 % — SIGNIFICANT CHANGE UP (ref 34.5–45)
HGB BLD-MCNC: 11.4 G/DL — LOW (ref 11.5–15.5)
IMM GRANULOCYTES NFR BLD AUTO: 0.2 % — SIGNIFICANT CHANGE UP (ref 0–1.5)
LYMPHOCYTES # BLD AUTO: 1.63 K/UL — SIGNIFICANT CHANGE UP (ref 1–3.3)
LYMPHOCYTES # BLD AUTO: 29.9 % — SIGNIFICANT CHANGE UP (ref 13–44)
MCHC RBC-ENTMCNC: 25.1 PG — LOW (ref 27–34)
MCHC RBC-ENTMCNC: 30.4 G/DL — LOW (ref 32–36)
MCV RBC AUTO: 82.4 FL — SIGNIFICANT CHANGE UP (ref 80–100)
MONOCYTES # BLD AUTO: 0.4 K/UL — SIGNIFICANT CHANGE UP (ref 0–0.9)
MONOCYTES NFR BLD AUTO: 7.3 % — SIGNIFICANT CHANGE UP (ref 2–14)
NEUTROPHILS # BLD AUTO: 3.27 K/UL — SIGNIFICANT CHANGE UP (ref 1.8–7.4)
NEUTROPHILS NFR BLD AUTO: 60.1 % — SIGNIFICANT CHANGE UP (ref 43–77)
NRBC # BLD: 0 /100 WBCS — SIGNIFICANT CHANGE UP (ref 0–0)
PLATELET # BLD AUTO: 299 K/UL — SIGNIFICANT CHANGE UP (ref 150–400)
POTASSIUM SERPL-SCNC: 4.4 MMOL/L
PROT SERPL-MCNC: 6.6 G/DL
RBC # BLD: 4.55 M/UL — SIGNIFICANT CHANGE UP (ref 3.8–5.2)
RBC # FLD: 17.4 % — HIGH (ref 10.3–14.5)
SODIUM SERPL-SCNC: 141 MMOL/L
WBC # BLD: 5.45 K/UL — SIGNIFICANT CHANGE UP (ref 3.8–10.5)
WBC # FLD AUTO: 5.45 K/UL — SIGNIFICANT CHANGE UP (ref 3.8–10.5)

## 2021-08-23 PROCEDURE — 99214 OFFICE O/P EST MOD 30 MIN: CPT

## 2021-08-23 RX ORDER — RIVAROXABAN 15 MG/1
15 TABLET, FILM COATED ORAL
Refills: 0 | Status: DISCONTINUED | COMMUNITY
End: 2021-08-23

## 2021-08-23 RX ORDER — RIVAROXABAN 20 MG/1
20 TABLET, FILM COATED ORAL
Refills: 0 | Status: ACTIVE | COMMUNITY
Start: 2021-08-23

## 2021-08-24 LAB — CARDIOLIPIN AB SER IA-ACNC: NEGATIVE

## 2021-08-25 LAB
B2 GLYCOPROT1 IGA SERPL IA-ACNC: >150 SAU
CARDIOLIPIN IGM SER-MCNC: 20.2 MPL
CARDIOLIPIN IGM SER-MCNC: <5 GPL

## 2021-08-25 NOTE — HISTORY OF PRESENT ILLNESS
[Disease:__________________________] : Disease: [unfilled] [Cardiovascular] : Cardiovascular [Constitutional] : Constitutional [ENT] : ENT [Dermatologic] : Dermatologic [Endocrine] : Endocrine [Gastrointestinal] : Gastrointestinal [Genitourinary] : Genitourinary [Gynecologic] : Gynecologic [Infectious] : Infectious [Musculoskeletal] : Musculoskeletal [Neurologic] : Neurologic [Pain] : Pain [Pulmonary] : Pulmonary [Hematologic] : Hematologic [Date: ____________] : Patient's last distress assessment performed on [unfilled]. [0 - No Distress] : Distress Level: 0 [de-identified] : Ratna Vincent has been treated for 5 years with a 1% estradiol patch when first seen by GYN service in 01/2021 for vaginal spotting. She was treated with  a dilatation and curettage on 04/16/2021 and post operatively she received oral Megace. She was bleeding through out April. She discontinued Megace on 05/18 /2021 and was  was first seen by Dr Shannon Hewitt for evaluation for outpatient GYN surgery including abdominal hysterectomy.\par On May 25 2021 she had a near syncopal episode prior to  her shower and she presented to the [facility] ER with heavy vaginal bleeding and leg pain.CT scan of the abdomen showed a enlarged myomatous uterus with abnormal thickening of the endometrium  and presence of blood in the uterus cavity. CBC WBC 12.8 HGB 6.6  000.\par  She was diagnosed to have bilateral deep venous thrombosis located below the knee. Deep venous clots were noted in the left soleal vein (calf) and right saphenous system below the knee. She was seen by hematology service and anticoagulation was held due to low probability of pulmonary embolism due to status of clot below the knee; although venous filter was considered it was not placed.  \par She received multiple units of packed red cells and on 05/26/2021 she was treated with inpatient surgery for exploratory laparotomy,EDISON and BSO. Uterine pathology showed simple and complex endometrial hyperplasia with atypia. \par On June 8 2021  She was seen in the outpatient office on 06/08/2021 and was recovering. No vaginal bleeding and taking oral feeding without nausea or vomiting. No post operative fever no chills and no calf pain. Bowel and bladder functioning normally.\par She is currently on rivaroxaban 15 mg PO BID [FreeTextEntry1] : rivaroxaban 20 mg PO daily [de-identified] : She was last seen on 07/28/2021; improvelment in leg swelling and Doppler results reviewed on 07/01/2021 [de-identified] : Hematologic:

## 2021-08-25 NOTE — ASSESSMENT
[Supportive] : Goals of care discussed with patient: Supportive [Palliative Care Plan] : not applicable at this time [FreeTextEntry1] : JOSE ARMANDO Vincent is a 60 year old female who had a bilateral DVT following surgery and significant anemia requiring blood transfusion. She had normal total anticardiolipin antibody testing and slight elevation of Ig M anticardiolipin antibody and beta 1 glycoprotein.\par Her history since July 28 is benign and the physical examination is normal\par I would advise against the use of estrogen as it may increase risk of blood clotting. She is approximately  60 days of anticoagulation treatment with DO AC. I have recommended that she receive the vaccination against SARS novel C o V 2. She has not decided to receive the vaccinate. Normal protein S levels.\par Repeat blood studies today. Repeat US doppler of legs on August 30.\par  RTC 09/14/2021

## 2021-08-25 NOTE — PHYSICAL EXAM
[Fully active, able to carry on all pre-disease performance without restriction] : Status 0 - Fully active, able to carry on all pre-disease performance without restriction [Normal] : grossly intact [de-identified] : no edema in the legs

## 2021-08-25 NOTE — REASON FOR VISIT
[Follow-Up Visit] : a follow-up visit for [Blood Count Assessment] : blood count assessment [Coagulopathy] : coagulopathy [FreeTextEntry2] : slight elevation of the anticardiolipin antibody Ig M

## 2021-08-25 NOTE — ADDENDUM
[FreeTextEntry1] : Addendum: I called [facility] on August 25 to report the persistent elevation IgM anticardiolipin (screen is negative) and the elevation of glycoprotein 1. Discussion of continuing anticoagulation

## 2021-08-30 ENCOUNTER — OUTPATIENT (OUTPATIENT)
Dept: OUTPATIENT SERVICES | Facility: HOSPITAL | Age: 61
LOS: 1 days | End: 2021-08-30
Payer: MEDICAID

## 2021-08-30 ENCOUNTER — APPOINTMENT (OUTPATIENT)
Dept: ULTRASOUND IMAGING | Facility: IMAGING CENTER | Age: 61
End: 2021-08-30
Payer: MEDICAID

## 2021-08-30 DIAGNOSIS — Z87.59 PERSONAL HISTORY OF OTHER COMPLICATIONS OF PREGNANCY, CHILDBIRTH AND THE PUERPERIUM: Chronic | ICD-10-CM

## 2021-08-30 DIAGNOSIS — Z96.7 PRESENCE OF OTHER BONE AND TENDON IMPLANTS: Chronic | ICD-10-CM

## 2021-08-30 DIAGNOSIS — I82.409 ACUTE EMBOLISM AND THROMBOSIS OF UNSPECIFIED DEEP VEINS OF UNSPECIFIED LOWER EXTREMITY: ICD-10-CM

## 2021-08-30 DIAGNOSIS — Z98.890 OTHER SPECIFIED POSTPROCEDURAL STATES: Chronic | ICD-10-CM

## 2021-08-30 DIAGNOSIS — N85.02 ENDOMETRIAL INTRAEPITHELIAL NEOPLASIA [EIN]: ICD-10-CM

## 2021-08-30 DIAGNOSIS — D62 ACUTE POSTHEMORRHAGIC ANEMIA: ICD-10-CM

## 2021-08-30 PROCEDURE — 93970 EXTREMITY STUDY: CPT | Mod: 26

## 2021-08-30 PROCEDURE — 93970 EXTREMITY STUDY: CPT

## 2021-09-10 ENCOUNTER — OUTPATIENT (OUTPATIENT)
Dept: OUTPATIENT SERVICES | Facility: HOSPITAL | Age: 61
LOS: 1 days | Discharge: HOME | End: 2021-09-10

## 2021-09-10 DIAGNOSIS — D68.59 OTHER PRIMARY THROMBOPHILIA: ICD-10-CM

## 2021-09-10 DIAGNOSIS — Z98.890 OTHER SPECIFIED POSTPROCEDURAL STATES: Chronic | ICD-10-CM

## 2021-09-10 DIAGNOSIS — Z96.7 PRESENCE OF OTHER BONE AND TENDON IMPLANTS: Chronic | ICD-10-CM

## 2021-09-10 DIAGNOSIS — Z87.59 PERSONAL HISTORY OF OTHER COMPLICATIONS OF PREGNANCY, CHILDBIRTH AND THE PUERPERIUM: Chronic | ICD-10-CM

## 2021-09-14 ENCOUNTER — RESULT REVIEW (OUTPATIENT)
Age: 61
End: 2021-09-14

## 2021-09-14 ENCOUNTER — APPOINTMENT (OUTPATIENT)
Dept: HEMATOLOGY ONCOLOGY | Facility: CLINIC | Age: 61
End: 2021-09-14
Payer: MEDICAID

## 2021-09-14 VITALS
DIASTOLIC BLOOD PRESSURE: 77 MMHG | OXYGEN SATURATION: 98 % | SYSTOLIC BLOOD PRESSURE: 114 MMHG | HEIGHT: 66.1 IN | WEIGHT: 180.78 LBS | BODY MASS INDEX: 29.05 KG/M2 | HEART RATE: 73 BPM | RESPIRATION RATE: 18 BRPM | TEMPERATURE: 97.9 F

## 2021-09-14 LAB
BASOPHILS # BLD AUTO: 0.07 K/UL — SIGNIFICANT CHANGE UP (ref 0–0.2)
BASOPHILS NFR BLD AUTO: 1.1 % — SIGNIFICANT CHANGE UP (ref 0–2)
EOSINOPHIL # BLD AUTO: 0.1 K/UL — SIGNIFICANT CHANGE UP (ref 0–0.5)
EOSINOPHIL NFR BLD AUTO: 1.6 % — SIGNIFICANT CHANGE UP (ref 0–6)
HCT VFR BLD CALC: 38.3 % — SIGNIFICANT CHANGE UP (ref 34.5–45)
HGB BLD-MCNC: 12.1 G/DL — SIGNIFICANT CHANGE UP (ref 11.5–15.5)
IMM GRANULOCYTES NFR BLD AUTO: 0.6 % — SIGNIFICANT CHANGE UP (ref 0–1.5)
LYMPHOCYTES # BLD AUTO: 1.77 K/UL — SIGNIFICANT CHANGE UP (ref 1–3.3)
LYMPHOCYTES # BLD AUTO: 28 % — SIGNIFICANT CHANGE UP (ref 13–44)
MCHC RBC-ENTMCNC: 25.1 PG — LOW (ref 27–34)
MCHC RBC-ENTMCNC: 31.6 G/DL — LOW (ref 32–36)
MCV RBC AUTO: 79.5 FL — LOW (ref 80–100)
MONOCYTES # BLD AUTO: 0.41 K/UL — SIGNIFICANT CHANGE UP (ref 0–0.9)
MONOCYTES NFR BLD AUTO: 6.5 % — SIGNIFICANT CHANGE UP (ref 2–14)
NEUTROPHILS # BLD AUTO: 3.93 K/UL — SIGNIFICANT CHANGE UP (ref 1.8–7.4)
NEUTROPHILS NFR BLD AUTO: 62.2 % — SIGNIFICANT CHANGE UP (ref 43–77)
NRBC # BLD: 0 /100 WBCS — SIGNIFICANT CHANGE UP (ref 0–0)
PLATELET # BLD AUTO: 263 K/UL — SIGNIFICANT CHANGE UP (ref 150–400)
RBC # BLD: 4.82 M/UL — SIGNIFICANT CHANGE UP (ref 3.8–5.2)
RBC # FLD: 19.1 % — HIGH (ref 10.3–14.5)
WBC # BLD: 6.32 K/UL — SIGNIFICANT CHANGE UP (ref 3.8–10.5)
WBC # FLD AUTO: 6.32 K/UL — SIGNIFICANT CHANGE UP (ref 3.8–10.5)

## 2021-09-14 PROCEDURE — 99213 OFFICE O/P EST LOW 20 MIN: CPT

## 2021-09-17 NOTE — BRIEF OPERATIVE NOTE - DISPOSITION
09/17/21      Simone Cotter  [address] [id number]    CERTIFICATE.    This is to certify that Simone Cotter has been under my care and has been evaluated at Urgent Care on Friday 09/17/21.        SIGNATURE:___________________________________________                             Dr. Rolo Arevalo   Urgent Care   [facility]  [address] [place] [address] [phone]  
PACU to home

## 2021-10-22 NOTE — HISTORY OF PRESENT ILLNESS
[Disease:__________________________] : Disease: [unfilled] [de-identified] : Ratna Vincent has been treated for 5 years with a 1% estradiol patch when first seen by GYN service in 01/2021 for vaginal spotting. She was treated with  a dilatation and curettage on 04/16/2021 and post operatively she received oral Megace. She was bleeding through out April. She discontinued Megace on 05/18 /2021 and was  was first seen by Dr Shannon Hewitt for evaluation for outpatient GYN surgery including abdominal hysterectomy.\par On May 25 2021 she had a near syncopal episode prior to  her shower and she presented to the [facility] ER with heavy vaginal bleeding and leg pain.CT scan of the abdomen showed a enlarged myomatous uterus with abnormal thickening of the endometrium  and presence of blood in the uterus cavity. CBC WBC 12.8 HGB 6.6  000.\par  She was diagnosed to have bilateral deep venous thrombosis located below the knee. Deep venous clots were noted in the left soleal vein (calf) and right saphenous system below the knee. She was seen by hematology service and anticoagulation was held due to low probability of pulmonary embolism due to status of clot below the knee; although venous filter was considered it was not placed.  \par She received multiple units of packed red cells and on 05/26/2021 she was treated with inpatient surgery for exploratory laparotomy,EDISON and BSO. Uterine pathology showed simple and complex endometrial hyperplasia with atypia. \par 6/24/21 Initial office visit  She  was changed to rivaroxaban 20 mg po bid. Counseled regarding Iron 2 tabs po every other day with citrus for WILLOW.Hgb 9.1. She is negative for  Protein S deficiency. \par 7/1/21 Venous Doppler bilateral LE: thrombus involving the right mid and distal gastrocnemius vein as well as small amount clot persistent right gastrocnemius vein\par 7/28/21+ cramping right foot but improving fatigue and exercise tolerance.\par 8/23/21 Stable \par \par  [FreeTextEntry1] : rivaroxaban 20 mg PO daily [de-identified] : Feels great, has not had vaginal bleeding. She has no more cramping in the legs. Hgb 12 \par She is planning vacation next month\par

## 2021-10-22 NOTE — RESULTS/DATA
[FreeTextEntry1] : 7/1/21 Bilateral Venous Doppler  Normal compressibility of the RIGHT common femoral, femoral and popliteal veins. Doppler examination shows normal spontaneous and phasic flow. Trace amount of residual clot is seen involving the sigmoid mesentery Persistent thrombosis involving saphenous vein involving the distal thigh into the popliteal fossa region. There is no propagation superiorly\par  \par 8/30/21: Bilateral Venous Doppler: Peripheral, nonocclusive, chronic appearing thrombus within the right gastrocnemius vein, improved compared to 07/01/2021.  No evidence of deep venous thrombosis in the left lower extremity.\par \par            6/24/21 8/23/21\par B2 Glycoprotein 1 IgA Ab >150.0                 > 150.0 SA\par Total ACL antibody level negative\par  Cardiolipin Ab IgM 15 (NL 12)                          20.2 \par  Cardiolipin AB IgG <5                                       <5\par \par Protein Gene Mutation negative \par Protein S Antigen Assay Total 73%\par D Dimer 156\par Fibrinogen 508. \par

## 2021-10-22 NOTE — ASSESSMENT
[Supportive] : Goals of care discussed with patient: Supportive [Palliative Care Plan] : not applicable at this time [FreeTextEntry1] : JOSE ARMANDO Vincent is a 60 year old female with a history of heavy menstruation and she has prior use hormone treatment. Significant blood loss through April 2021  and prior uterine biopsy D and C ; she developed blood loss anemia; She is status post blood transfusion May 21 through May 25 and EDISON BSO; benign pathology.\par She had normal total anticardiolipin antibody testing ,slight elevation of Ig M anticardiolipin antibody and elevated  beta 1 glycoprotein.\par She was started on oral  iron 2 tabs of 65 mg po every other day with citrus for iron deficiency anemia. \par Her bilateral venous Doppler study on July 1 shows persistent thrombus involving the right mid and distal gastrocnemius vein as well as small amount clot persistent right gastrocnemius vein without propagation. \par \par Her exercise tolerance has continued to improve since her hospitalization  improved and she is having no bleeding.\par Her history since July 28 is benign and the physical examination is normal. She was advised against the use of estrogen as it may increase risk of blood clotting. She is approximately  60 days of anticoagulation treatment with DO AC. I have recommended that she receive the vaccination against SARS novel C o V 2.\par \par \par Today Her energy continues to improve and has not had recurrent vaginal bleeding. Venous doppler of the lower extremities performed to weeks ago improvement in the RLE thrombus. Physical examination is unremarkable. \par \par PLAN\par CBC today\par Continue Iron 2 tabs of 65 mg po every other day with citrus\par Continue Xarelto 20 mg po daily \par Weakly positive Cardiolipin Ab IgM and  elevated Beta2 Glycoprotein IgA Ab\par She is almost 4 months of anticoagulation treatment with DO AC (discharged 5/29/21). \par I have reiterated recommendation  that she receive the vaccination against SARS novel C o V 2 especially if travel  is planned. \par RTO 1 month with repeat Cardiolipin Ab IgM, Beta 2 Glycoprotein IgA Ab

## 2021-11-07 ENCOUNTER — OUTPATIENT (OUTPATIENT)
Dept: OUTPATIENT SERVICES | Facility: HOSPITAL | Age: 61
LOS: 1 days | Discharge: HOME | End: 2021-11-07

## 2021-11-07 DIAGNOSIS — Z96.7 PRESENCE OF OTHER BONE AND TENDON IMPLANTS: Chronic | ICD-10-CM

## 2021-11-07 DIAGNOSIS — Z98.890 OTHER SPECIFIED POSTPROCEDURAL STATES: Chronic | ICD-10-CM

## 2021-11-07 DIAGNOSIS — D68.59 OTHER PRIMARY THROMBOPHILIA: ICD-10-CM

## 2021-11-07 DIAGNOSIS — Z87.59 PERSONAL HISTORY OF OTHER COMPLICATIONS OF PREGNANCY, CHILDBIRTH AND THE PUERPERIUM: Chronic | ICD-10-CM

## 2021-11-09 ENCOUNTER — APPOINTMENT (OUTPATIENT)
Dept: HEMATOLOGY ONCOLOGY | Facility: CLINIC | Age: 61
End: 2021-11-09

## 2021-12-01 ENCOUNTER — APPOINTMENT (OUTPATIENT)
Dept: HEMATOLOGY ONCOLOGY | Facility: CLINIC | Age: 61
End: 2021-12-01

## 2022-06-22 ENCOUNTER — OUTPATIENT (OUTPATIENT)
Dept: OUTPATIENT SERVICES | Facility: HOSPITAL | Age: 62
LOS: 1 days | Discharge: HOME | End: 2022-06-22

## 2022-06-22 DIAGNOSIS — D68.59 OTHER PRIMARY THROMBOPHILIA: ICD-10-CM

## 2022-06-22 DIAGNOSIS — Z96.7 PRESENCE OF OTHER BONE AND TENDON IMPLANTS: Chronic | ICD-10-CM

## 2022-06-22 DIAGNOSIS — Z87.59 PERSONAL HISTORY OF OTHER COMPLICATIONS OF PREGNANCY, CHILDBIRTH AND THE PUERPERIUM: Chronic | ICD-10-CM

## 2022-06-22 DIAGNOSIS — Z98.890 OTHER SPECIFIED POSTPROCEDURAL STATES: Chronic | ICD-10-CM

## 2022-07-26 ENCOUNTER — APPOINTMENT (OUTPATIENT)
Dept: HEMATOLOGY ONCOLOGY | Facility: CLINIC | Age: 62
End: 2022-07-26

## 2022-07-26 DIAGNOSIS — D62 ACUTE POSTHEMORRHAGIC ANEMIA: ICD-10-CM

## 2022-07-26 DIAGNOSIS — I82.409 ACUTE EMBOLISM AND THROMBOSIS OF UNSPECIFIED DEEP VEINS OF UNSPECIFIED LOWER EXTREMITY: ICD-10-CM

## 2022-07-26 NOTE — HISTORY OF PRESENT ILLNESS
[Disease:__________________________] : Disease: [unfilled] [de-identified] : Ratna Vincent has been treated for 5 years with a 1% estradiol patch when first seen by GYN service in 01/2021 for vaginal spotting. She was treated with  a dilatation and curettage on 04/16/2021 and post operatively she received oral Megace. She was bleeding through out April. She discontinued Megace on 05/18 /2021 and was  was first seen by Dr Shannon Hewitt for evaluation for outpatient GYN surgery including abdominal hysterectomy.\par On May 25 2021 she had a near syncopal episode prior to  her shower and she presented to the [facility] ER with heavy vaginal bleeding and leg pain.CT scan of the abdomen showed a enlarged myomatous uterus with abnormal thickening of the endometrium  and presence of blood in the uterus cavity. CBC WBC 12.8 HGB 6.6  000.\par  She was diagnosed to have bilateral deep venous thrombosis located below the knee. Deep venous clots were noted in the left soleal vein (calf) and right saphenous system below the knee. She was seen by hematology service and anticoagulation was held due to low probability of pulmonary embolism due to status of clot below the knee; although venous filter was considered it was not placed.  \par She received multiple units of packed red cells and on 05/26/2021 she was treated with inpatient surgery for exploratory laparotomy,EDISON and BSO. Uterine pathology showed simple and complex endometrial hyperplasia with atypia. \par On June 8 2021  She was seen in the outpatient office on 06/08/2021 and was recovering. No vaginal bleeding and taking oral feeding without nausea or vomiting. No post operative fever no chills and no calf pain. Bowel and bladder functioning normally.\par She is currently on rivaroxaban 15 mg PO BID

## 2024-10-11 NOTE — PATIENT PROFILE ADULT - NSPRESCRUSEDDRG_GEN_A_NUR
10/10/24 1140   Pain Assessment   Pain Assessment 0-10   Pain Level 6  (refused pain medication d/t nausea and not able to take zofran, too soon for administeration)   Patient's Stated Pain Goal 0 - No pain   Pain Location Back   Pain Orientation Left   Pain Descriptors Aching   Functional Pain Assessment Prevents or interferes some active activities and ADLs   Pain Type Acute pain;Surgical pain   Pain Frequency Continuous   Pain Onset On-going   Non-Pharmaceutical Pain Intervention(s) Repositioned   Response to Pain Intervention Pain improved but above pain goal   Side Effects No reported side effects       
   10/11/24 1422   AVS Reviewed   AVS & discharge instructions reviewed with patient and/or representative? Yes   Reviewed instructions with Patient   Level of Understanding Questions answered;Teach back completed;Verbalized understanding;Return demonstration     Patient was reluctant to be discharged due to finding out she did not have insurance.  She said she was having difficulty speaking with her insurance company and felt /SW needed to call and find out what was going on.  Updated patient insurance companies can only speak with the insured persons but would request CM to come in and speak with her.  Patient stated she felt more relieved after talking with CM.  Reluctantly, she got herself ready, mother and brother came and transported her home.  
No

## 2024-10-17 NOTE — PACU DISCHARGE NOTE - NS MD DISCHARGE NOTE DISCHARGE
Close to goal, continue current meds  Lab Results   Component Value Date    .8 (H) 10/31/2023         Orders:    Lipid Panel; Future    atorvastatin (LIPITOR) 40 MG tablet; Take 1 tablet by mouth daily    Lipid Panel     Home